# Patient Record
Sex: FEMALE | Race: OTHER | Employment: UNEMPLOYED | URBAN - METROPOLITAN AREA
[De-identification: names, ages, dates, MRNs, and addresses within clinical notes are randomized per-mention and may not be internally consistent; named-entity substitution may affect disease eponyms.]

---

## 2022-01-05 ENCOUNTER — HOSPITAL ENCOUNTER (EMERGENCY)
Facility: HOSPITAL | Age: 32
Discharge: HOME | End: 2022-01-05
Attending: EMERGENCY MEDICINE
Payer: MEDICAID

## 2022-01-05 ENCOUNTER — APPOINTMENT (OUTPATIENT)
Dept: CT IMAGING | Facility: HOSPITAL | Age: 32
End: 2022-01-05
Attending: EMERGENCY MEDICINE
Payer: MEDICAID

## 2022-01-05 VITALS
DIASTOLIC BLOOD PRESSURE: 92 MMHG | TEMPERATURE: 98 F | HEIGHT: 66 IN | BODY MASS INDEX: 35.36 KG/M2 | RESPIRATION RATE: 18 BRPM | HEART RATE: 82 BPM | WEIGHT: 220 LBS | SYSTOLIC BLOOD PRESSURE: 133 MMHG | OXYGEN SATURATION: 97 %

## 2022-01-05 DIAGNOSIS — U07.1 COVID-19: Primary | ICD-10-CM

## 2022-01-05 DIAGNOSIS — R11.2 NAUSEA AND VOMITING IN ADULT: ICD-10-CM

## 2022-01-05 LAB
ANION GAP SERPL CALC-SCNC: 10 MMOL/L (ref 0–18)
B-HCG UR QL: NEGATIVE
BASOPHILS # BLD AUTO: 0.01 X10(3) UL (ref 0–0.2)
BASOPHILS NFR BLD AUTO: 0.2 %
BILIRUB UR QL CFM: NEGATIVE
BUN BLD-MCNC: 4 MG/DL (ref 7–18)
BUN/CREAT SERPL: 6.1 (ref 10–20)
CALCIUM BLD-MCNC: 9.5 MG/DL (ref 8.5–10.1)
CHLORIDE SERPL-SCNC: 100 MMOL/L (ref 98–112)
CO2 SERPL-SCNC: 27 MMOL/L (ref 21–32)
COLOR UR: YELLOW
CREAT BLD-MCNC: 0.66 MG/DL
DEPRECATED RDW RBC AUTO: 45.1 FL (ref 35.1–46.3)
EOSINOPHIL # BLD AUTO: 0.06 X10(3) UL (ref 0–0.7)
EOSINOPHIL NFR BLD AUTO: 1 %
ERYTHROCYTE [DISTWIDTH] IN BLOOD BY AUTOMATED COUNT: 13.2 % (ref 11–15)
GLUCOSE BLD-MCNC: 120 MG/DL (ref 70–99)
GLUCOSE UR-MCNC: NEGATIVE MG/DL
HCT VFR BLD AUTO: 46.4 %
HGB BLD-MCNC: 15.5 G/DL
IMM GRANULOCYTES # BLD AUTO: 0.02 X10(3) UL (ref 0–1)
IMM GRANULOCYTES NFR BLD: 0.3 %
KETONES UR-MCNC: 20 MG/DL
LYMPHOCYTES # BLD AUTO: 2.36 X10(3) UL (ref 1–4)
LYMPHOCYTES NFR BLD AUTO: 40 %
MCH RBC QN AUTO: 31.1 PG (ref 26–34)
MCHC RBC AUTO-ENTMCNC: 33.4 G/DL (ref 31–37)
MCV RBC AUTO: 93 FL
MONOCYTES # BLD AUTO: 0.75 X10(3) UL (ref 0.1–1)
MONOCYTES NFR BLD AUTO: 12.7 %
NEUTROPHILS # BLD AUTO: 2.7 X10 (3) UL (ref 1.5–7.7)
NEUTROPHILS # BLD AUTO: 2.7 X10(3) UL (ref 1.5–7.7)
NEUTROPHILS NFR BLD AUTO: 45.8 %
NITRITE UR QL STRIP.AUTO: NEGATIVE
OSMOLALITY SERPL CALC.SUM OF ELEC: 282 MOSM/KG (ref 275–295)
PH UR: 6 [PH] (ref 5–8)
PLATELET # BLD AUTO: 336 10(3)UL (ref 150–450)
POTASSIUM SERPL-SCNC: 3.4 MMOL/L (ref 3.5–5.1)
PROT UR-MCNC: 100 MG/DL
RBC # BLD AUTO: 4.99 X10(6)UL
SODIUM SERPL-SCNC: 137 MMOL/L (ref 136–145)
SP GR UR STRIP: 1.03 (ref 1–1.03)
UROBILINOGEN UR STRIP-ACNC: 4
WBC # BLD AUTO: 5.9 X10(3) UL (ref 4–11)

## 2022-01-05 PROCEDURE — 87086 URINE CULTURE/COLONY COUNT: CPT | Performed by: EMERGENCY MEDICINE

## 2022-01-05 PROCEDURE — 87186 SC STD MICRODIL/AGAR DIL: CPT | Performed by: EMERGENCY MEDICINE

## 2022-01-05 PROCEDURE — 87077 CULTURE AEROBIC IDENTIFY: CPT | Performed by: EMERGENCY MEDICINE

## 2022-01-05 PROCEDURE — 80048 BASIC METABOLIC PNL TOTAL CA: CPT | Performed by: EMERGENCY MEDICINE

## 2022-01-05 PROCEDURE — 99284 EMERGENCY DEPT VISIT MOD MDM: CPT

## 2022-01-05 PROCEDURE — 85025 COMPLETE CBC W/AUTO DIFF WBC: CPT | Performed by: EMERGENCY MEDICINE

## 2022-01-05 PROCEDURE — 96361 HYDRATE IV INFUSION ADD-ON: CPT

## 2022-01-05 PROCEDURE — 81025 URINE PREGNANCY TEST: CPT

## 2022-01-05 PROCEDURE — 96374 THER/PROPH/DIAG INJ IV PUSH: CPT

## 2022-01-05 PROCEDURE — 81001 URINALYSIS AUTO W/SCOPE: CPT | Performed by: EMERGENCY MEDICINE

## 2022-01-05 PROCEDURE — 74176 CT ABD & PELVIS W/O CONTRAST: CPT | Performed by: EMERGENCY MEDICINE

## 2022-01-05 RX ORDER — ONDANSETRON 4 MG/1
4 TABLET, ORALLY DISINTEGRATING ORAL EVERY 4 HOURS PRN
Qty: 10 TABLET | Refills: 0 | Status: SHIPPED | OUTPATIENT
Start: 2022-01-05 | End: 2022-01-12

## 2022-01-05 RX ORDER — ONDANSETRON 2 MG/ML
4 INJECTION INTRAMUSCULAR; INTRAVENOUS ONCE
Status: COMPLETED | OUTPATIENT
Start: 2022-01-05 | End: 2022-01-05

## 2022-01-05 NOTE — ED PROVIDER NOTES
Patient Seen in: [facility] AND [facility] Emergency Department      History   Patient presents with:  Nausea/Vomiting/Diarrhea    Stated Complaint: vomiting    Subjective:   HPI    Pt is 33 yo F who p/w dysuria and vomiting x 1 week.  +left kidney and flank pain Urine Large (*)     Protein Urine 100  (*)     Urobilinogen Urine 4.0 (*)     Leukocyte Esterase Urine Trace (*)     WBC Urine 11-20 (*)     RBC Urine 3-5 (*)     Bacteria Urine Rare (*)     Squamous Epi.  Cells Few (*)     All other components within ac intraperitoneal air. No evidence of AAA. Scattered subcentimeter retroperitoneal and mesenteric lymph nodes. Case discussed with  Dr. Ricki Rodriguez in the ER  at 05:20 AM ET on 01/05/2022. Luis Alberto Anderson M.D.     Radiology exams  Viewed and reviewed

## 2022-01-05 NOTE — ED QUICK NOTES
Patient provided with discharge instructions. Verbalized understanding for plan of care at home and follow up. All question and concerns addressed prior to discharge. Iv removed, catheter intact.

## 2022-01-05 NOTE — ED INITIAL ASSESSMENT (HPI)
Patient to ER from home with c/o abdominal pain dysuria and vomiting X1 week states she recently passed a kidney stone. Patient states she just finished antibiotics for infected kidney stone.

## 2022-05-23 ENCOUNTER — OFFICE VISIT (OUTPATIENT)
Dept: OBGYN CLINIC | Facility: CLINIC | Age: 32
End: 2022-05-23
Payer: MEDICAID

## 2022-05-23 VITALS
DIASTOLIC BLOOD PRESSURE: 65 MMHG | HEART RATE: 103 BPM | WEIGHT: 221 LBS | HEIGHT: 66 IN | BODY MASS INDEX: 35.52 KG/M2 | SYSTOLIC BLOOD PRESSURE: 101 MMHG

## 2022-05-23 DIAGNOSIS — N92.6 MISSED MENSES: Primary | ICD-10-CM

## 2022-05-23 PROBLEM — Z86.59 HISTORY OF SUICIDAL IDEATION: Status: RESOLVED | Noted: 2017-01-01 | Resolved: 2022-05-23

## 2022-05-23 PROBLEM — Z86.59 HISTORY OF SUICIDAL IDEATION: Status: ACTIVE | Noted: 2017-01-01

## 2022-05-23 PROBLEM — H54.40 BLINDNESS OF LEFT EYE: Status: ACTIVE | Noted: 2017-01-01

## 2022-05-23 LAB
CONTROL LINE PRESENT WITH A CLEAR BACKGROUND (YES/NO): YES YES/NO
PREGNANCY TEST, URINE: POSITIVE

## 2022-05-23 PROCEDURE — 3074F SYST BP LT 130 MM HG: CPT | Performed by: NURSE PRACTITIONER

## 2022-05-23 PROCEDURE — 99203 OFFICE O/P NEW LOW 30 MIN: CPT | Performed by: NURSE PRACTITIONER

## 2022-05-23 PROCEDURE — 81025 URINE PREGNANCY TEST: CPT | Performed by: NURSE PRACTITIONER

## 2022-05-23 PROCEDURE — 3008F BODY MASS INDEX DOCD: CPT | Performed by: NURSE PRACTITIONER

## 2022-05-23 PROCEDURE — 3078F DIAST BP <80 MM HG: CPT | Performed by: NURSE PRACTITIONER

## 2022-05-30 ENCOUNTER — TELEPHONE (OUTPATIENT)
Dept: OBGYN CLINIC | Facility: CLINIC | Age: 32
End: 2022-05-30

## 2022-05-30 RX ORDER — ONDANSETRON 4 MG/1
1 TABLET, ORALLY DISINTEGRATING ORAL EVERY 8 HOURS PRN
COMMUNITY
Start: 2022-05-22

## 2022-05-30 RX ORDER — ONDANSETRON 4 MG/1
4 TABLET, ORALLY DISINTEGRATING ORAL EVERY 8 HOURS PRN
Qty: 20 TABLET | Refills: 1 | Status: SHIPPED | OUTPATIENT
Start: 2022-05-30

## 2022-06-02 ENCOUNTER — HOSPITAL ENCOUNTER (OUTPATIENT)
Dept: ULTRASOUND IMAGING | Facility: HOSPITAL | Age: 32
Discharge: HOME | End: 2022-06-02
Attending: NURSE PRACTITIONER
Payer: MEDICAID

## 2022-06-02 ENCOUNTER — LAB ENCOUNTER (OUTPATIENT)
Dept: LAB | Facility: HOSPITAL | Age: 32
End: 2022-06-02
Attending: NURSE PRACTITIONER
Payer: MEDICAID

## 2022-06-02 DIAGNOSIS — N92.6 MISSED MENSES: ICD-10-CM

## 2022-06-02 LAB
B-HCG SERPL-ACNC: ABNORMAL MIU/ML
RH BLOOD TYPE: NEGATIVE

## 2022-06-02 PROCEDURE — 84702 CHORIONIC GONADOTROPIN TEST: CPT

## 2022-06-02 PROCEDURE — 76801 OB US < 14 WKS SINGLE FETUS: CPT | Performed by: NURSE PRACTITIONER

## 2022-06-02 PROCEDURE — 76817 TRANSVAGINAL US OBSTETRIC: CPT | Performed by: NURSE PRACTITIONER

## 2022-06-02 PROCEDURE — 86901 BLOOD TYPING SEROLOGIC RH(D): CPT

## 2022-06-02 PROCEDURE — 86900 BLOOD TYPING SEROLOGIC ABO: CPT

## 2022-06-02 PROCEDURE — 36415 COLL VENOUS BLD VENIPUNCTURE: CPT

## 2022-06-09 NOTE — TELEPHONE ENCOUNTER
From: Joes Duncan  To: NELY Lew  Sent: 2022 4:57 PM CDT  Subject: Therapy resources    I could really use a therapist. My boyfriend is less than happy I am pregnant. He is being extremely pushy about , and ignores me half the time and we live together. All he brings up is negative aspects and told me that it's my fault I am in this position because I prolonged the ultrasound and should have just gotten rid of it sooner. I didn't even know if the pregnancy was viable at the time. I feel really alone and like my only option is to leave, but he tells me Im a piece of crap if I do that and he makes me feel guilty because he can't afford our apartment without me and I'm all the family he has left. So I feel really trapt. I've had both kinds of abortions before, with him, and I've regretted it ever since. I don't think I'll mentally be able to handle another one. I start getting panic attacks everytime I think about it. My chest constantly hurts, so does my brain, I spend my days crying, panicking, and in a mental fog. He says he's there for me if I just talk to him,   but if I bring up I don't want an  he starts pointing out all the reasons I need to and ends up upsetting me and goes back to not talking to me again and avoiding me.  I need someone to talk to

## 2022-06-30 ENCOUNTER — TELEPHONE (OUTPATIENT)
Dept: OBGYN CLINIC | Facility: CLINIC | Age: 32
End: 2022-06-30

## 2022-06-30 NOTE — TELEPHONE ENCOUNTER
Patient believes she is 13 weeks along. Went to [facility] ER yesterday for upper abdominal pain. Ultrasounds there showed that she is only 11 weeks and a few days and there is concern that she has an anterior placenta. Nurse Ed appointment set for Chela Husain OB appointment set for 7/11. Please advise.

## 2022-06-30 NOTE — TELEPHONE ENCOUNTER
Inge Gordon went to [facility] ER for epigastric pain.  13w 5d. She had an OB ultrasound at [facility] with dating discrepancy per patient. Unable to see US report in [facility] at this time. ER urinalysis showed leukocytes but culture showed no bacterial growth. Inge Gordon reports some vulvar/vaginal burning. She has a schedule new OB visit with Dr. Bell Carreon on . Instructed to make an appointment for vaginal cultures and repeat UA prior to . Tammi's GI symptoms are being address by her GI MD per patient. Inge Gordon denies cramping, bleeding, and leaking of fluid at this time. Denies fever and chills.

## 2022-07-01 ENCOUNTER — NURSE ONLY (OUTPATIENT)
Dept: OBGYN CLINIC | Facility: CLINIC | Age: 32
End: 2022-07-01
Payer: MEDICAID

## 2022-07-01 DIAGNOSIS — Z34.82 ENCOUNTER FOR SUPERVISION OF OTHER NORMAL PREGNANCY IN SECOND TRIMESTER: Primary | ICD-10-CM

## 2022-07-01 DIAGNOSIS — L81.8 DECORATIVE TATTOO: ICD-10-CM

## 2022-07-01 NOTE — PROGRESS NOTES
Pt here today for RN [facility] Education. Missed menses apt with: Suresh Nelson  LMP: 3/26/2022    Pre  BMI: 35.04  EPDS score: 13   Pt already given resources for Nela Mary- have been in contact. +UPT at home:  n/a  +UPT in office: 2022    US: 2022  Working MUSA: 22  Hx of genetic abnormality in family: no  Hx of varicella: yes  Flu Vaccine: no  Covid Vaccine: no     Consent (if needed): n/a      Sterilization/Contraception: wants hysterectomy     OUD Screening: Pt. Has answered NO 5P questions and has NO  risk factors. Pt. Given What pregnant women need to know handout. Educational material reviewed with patient: Prenatal care, nutrition, weight gain recommendations, travel, exercise, intercourse, pregnancy changes, safe medications, pregnancy and work, fetal movement, labor and  labor, warning signs, food safety, tdap, cord blood, breastfeeding-try breast, circumcision-yes, and Group B strep. Blood transfusion if needed: yes  PN labs: ordered    Optional genetic screening labs were reviewed: Bibi Morganelor, FTS with US, Quad screen MSAFP and CF screening.      [facility] Media Policy: reviewed     NOB apt: 22 JENNIE

## 2022-07-18 ENCOUNTER — INITIAL PRENATAL (OUTPATIENT)
Dept: OBGYN CLINIC | Facility: CLINIC | Age: 32
End: 2022-07-18
Payer: MEDICAID

## 2022-07-18 ENCOUNTER — HOSPITAL ENCOUNTER (OUTPATIENT)
Dept: PERINATAL CARE | Facility: HOSPITAL | Age: 32
End: 2022-07-18
Attending: OBSTETRICS & GYNECOLOGY
Payer: MEDICAID

## 2022-07-18 ENCOUNTER — TELEPHONE (OUTPATIENT)
Dept: OBGYN CLINIC | Facility: CLINIC | Age: 32
End: 2022-07-18

## 2022-07-18 ENCOUNTER — LAB ENCOUNTER (OUTPATIENT)
Dept: LAB | Facility: HOSPITAL | Age: 32
End: 2022-07-18
Attending: OBSTETRICS & GYNECOLOGY
Payer: MEDICAID

## 2022-07-18 ENCOUNTER — HOSPITAL ENCOUNTER (OUTPATIENT)
Dept: PERINATAL CARE | Facility: HOSPITAL | Age: 32
Discharge: HOME | End: 2022-07-18
Attending: OBSTETRICS & GYNECOLOGY
Payer: MEDICAID

## 2022-07-18 VITALS
BODY MASS INDEX: 34 KG/M2 | SYSTOLIC BLOOD PRESSURE: 120 MMHG | WEIGHT: 212 LBS | HEART RATE: 88 BPM | DIASTOLIC BLOOD PRESSURE: 67 MMHG

## 2022-07-18 VITALS
SYSTOLIC BLOOD PRESSURE: 111 MMHG | BODY MASS INDEX: 34 KG/M2 | WEIGHT: 212.81 LBS | HEART RATE: 116 BPM | DIASTOLIC BLOOD PRESSURE: 74 MMHG

## 2022-07-18 DIAGNOSIS — F43.10 PTSD (POST-TRAUMATIC STRESS DISORDER): ICD-10-CM

## 2022-07-18 DIAGNOSIS — Z34.82 ENCOUNTER FOR SUPERVISION OF OTHER NORMAL PREGNANCY IN SECOND TRIMESTER: ICD-10-CM

## 2022-07-18 DIAGNOSIS — O09.891 MEDICATION EXPOSURE DURING FIRST TRIMESTER OF PREGNANCY: Primary | ICD-10-CM

## 2022-07-18 DIAGNOSIS — F33.41 RECURRENT MAJOR DEPRESSIVE DISORDER, IN PARTIAL REMISSION (HCC): ICD-10-CM

## 2022-07-18 DIAGNOSIS — O09.891 MEDICATION EXPOSURE DURING FIRST TRIMESTER OF PREGNANCY: ICD-10-CM

## 2022-07-18 DIAGNOSIS — E66.9 CLASS 2 OBESITY WITHOUT SERIOUS COMORBIDITY WITH BODY MASS INDEX (BMI) OF 35.0 TO 35.9 IN ADULT, UNSPECIFIED OBESITY TYPE: ICD-10-CM

## 2022-07-18 DIAGNOSIS — Z34.82 ENCOUNTER FOR SUPERVISION OF OTHER NORMAL PREGNANCY IN SECOND TRIMESTER: Primary | ICD-10-CM

## 2022-07-18 DIAGNOSIS — L81.8 DECORATIVE TATTOO: ICD-10-CM

## 2022-07-18 LAB
ANTIBODY SCREEN: NEGATIVE
APPEARANCE: CLEAR
BASOPHILS # BLD AUTO: 0.03 X10(3) UL (ref 0–0.2)
BASOPHILS NFR BLD AUTO: 0.3 %
BILIRUB UR QL: NEGATIVE
BILIRUBIN: NEGATIVE
CLARITY UR: CLEAR
COLOR UR: YELLOW
DEPRECATED HBV CORE AB SER IA-ACNC: 58.4 NG/ML
DEPRECATED RDW RBC AUTO: 41.3 FL (ref 35.1–46.3)
EOSINOPHIL # BLD AUTO: 0.2 X10(3) UL (ref 0–0.7)
EOSINOPHIL NFR BLD AUTO: 1.8 %
ERYTHROCYTE [DISTWIDTH] IN BLOOD BY AUTOMATED COUNT: 12 % (ref 11–15)
GLUCOSE (URINE DIPSTICK): NEGATIVE MG/DL
GLUCOSE UR-MCNC: NEGATIVE MG/DL
HBV SURFACE AG SER-ACNC: <0.1 [IU]/L
HBV SURFACE AG SERPL QL IA: NONREACTIVE
HCT VFR BLD AUTO: 37.5 %
HCV AB SERPL QL IA: NONREACTIVE
HGB BLD-MCNC: 12.3 G/DL
HGB UR QL STRIP.AUTO: NEGATIVE
IMM GRANULOCYTES # BLD AUTO: 0.04 X10(3) UL (ref 0–1)
IMM GRANULOCYTES NFR BLD: 0.4 %
KETONES (URINE DIPSTICK): NEGATIVE MG/DL
LEUKOCYTE ESTERASE UR QL STRIP.AUTO: NEGATIVE
LYMPHOCYTES # BLD AUTO: 3.29 X10(3) UL (ref 1–4)
LYMPHOCYTES NFR BLD AUTO: 30.3 %
MCH RBC QN AUTO: 30.6 PG (ref 26–34)
MCHC RBC AUTO-ENTMCNC: 32.8 G/DL (ref 31–37)
MCV RBC AUTO: 93.3 FL
MONOCYTES # BLD AUTO: 0.41 X10(3) UL (ref 0.1–1)
MONOCYTES NFR BLD AUTO: 3.8 %
MULTISTIX EXPIRATION DATE: ABNORMAL DATE
MULTISTIX LOT#: ABNORMAL NUMERIC
NEUTROPHILS # BLD AUTO: 6.88 X10 (3) UL (ref 1.5–7.7)
NEUTROPHILS # BLD AUTO: 6.88 X10(3) UL (ref 1.5–7.7)
NEUTROPHILS NFR BLD AUTO: 63.4 %
NITRITE UR QL STRIP.AUTO: NEGATIVE
NITRITE, URINE: NEGATIVE
OCCULT BLOOD: NEGATIVE
PH UR: 5.5 [PH] (ref 5–8)
PH, URINE: 7 (ref 4.5–8)
PLATELET # BLD AUTO: 326 10(3)UL (ref 150–450)
PROTEIN (URINE DIPSTICK): NEGATIVE MG/DL
RBC # BLD AUTO: 4.02 X10(6)UL
RH BLOOD TYPE: NEGATIVE
RUBV IGG SER QL: POSITIVE
RUBV IGG SER-ACNC: 98.9 IU/ML (ref 10–?)
SP GR UR STRIP: >=1.03 (ref 1–1.03)
SPECIFIC GRAVITY: 1.02 (ref 1–1.03)
TSI SER-ACNC: 1.39 MIU/ML (ref 0.36–3.74)
URINE-COLOR: YELLOW
UROBILINOGEN UR STRIP-ACNC: 0.2
UROBILINOGEN,SEMI-QN: 0.2 MG/DL (ref 0–1.9)
WBC # BLD AUTO: 10.9 X10(3) UL (ref 4–11)

## 2022-07-18 PROCEDURE — 3078F DIAST BP <80 MM HG: CPT | Performed by: OBSTETRICS & GYNECOLOGY

## 2022-07-18 PROCEDURE — 76815 OB US LIMITED FETUS(S): CPT | Performed by: OBSTETRICS & GYNECOLOGY

## 2022-07-18 PROCEDURE — 86901 BLOOD TYPING SEROLOGIC RH(D): CPT

## 2022-07-18 PROCEDURE — 84443 ASSAY THYROID STIM HORMONE: CPT

## 2022-07-18 PROCEDURE — 0500F INITIAL PRENATAL CARE VISIT: CPT | Performed by: OBSTETRICS & GYNECOLOGY

## 2022-07-18 PROCEDURE — 86850 RBC ANTIBODY SCREEN: CPT

## 2022-07-18 PROCEDURE — 3074F SYST BP LT 130 MM HG: CPT | Performed by: OBSTETRICS & GYNECOLOGY

## 2022-07-18 PROCEDURE — 85025 COMPLETE CBC W/AUTO DIFF WBC: CPT

## 2022-07-18 PROCEDURE — 82728 ASSAY OF FERRITIN: CPT

## 2022-07-18 PROCEDURE — 87086 URINE CULTURE/COLONY COUNT: CPT

## 2022-07-18 PROCEDURE — 36415 COLL VENOUS BLD VENIPUNCTURE: CPT

## 2022-07-18 PROCEDURE — 86780 TREPONEMA PALLIDUM: CPT

## 2022-07-18 PROCEDURE — 87389 HIV-1 AG W/HIV-1&-2 AB AG IA: CPT

## 2022-07-18 PROCEDURE — 87340 HEPATITIS B SURFACE AG IA: CPT

## 2022-07-18 PROCEDURE — 81003 URINALYSIS AUTO W/O SCOPE: CPT | Performed by: OBSTETRICS & GYNECOLOGY

## 2022-07-18 PROCEDURE — 86803 HEPATITIS C AB TEST: CPT

## 2022-07-18 PROCEDURE — 86762 RUBELLA ANTIBODY: CPT

## 2022-07-18 PROCEDURE — 81003 URINALYSIS AUTO W/O SCOPE: CPT

## 2022-07-18 PROCEDURE — 86900 BLOOD TYPING SEROLOGIC ABO: CPT

## 2022-07-18 RX ORDER — QUETIAPINE FUMARATE 25 MG/1
25 TABLET, FILM COATED ORAL NIGHTLY
COMMUNITY

## 2022-07-18 RX ORDER — CHOLECALCIFEROL (VITAMIN D3) 25 MCG
1 TABLET,CHEWABLE ORAL DAILY
COMMUNITY

## 2022-07-18 NOTE — TELEPHONE ENCOUNTER
Call placed to [facility] to request that patient is accommodated for expedited consult and ultrasound r/t current use of antipsychotic medication (QUEtiapine)    Order entered PA initiated for [address]97[address]. Patient scheduled for 2pm today. Patient and provider notified.

## 2022-07-18 NOTE — PROGRESS NOTES
Pt with hx of PTSD and pt was started on seroquel 1 week ago by her other doctor(pt was told safe in pregnancy). Pt has not seen mfm yet,  We are on the phone currently to help secure an appt for consult/ultrasound for pt. Pt given appt for 2 pm today, pt going there now.
Implemented All Universal Safety Interventions:  [place] to call system. Call bell, personal items and telephone within reach. Instruct patient to call for assistance. Room bathroom lighting operational. Non-slip footwear when patient is off stretcher. Physically safe environment: no spills, clutter or unnecessary equipment. Stretcher in lowest position, wheels locked, appropriate side rails in place.

## 2022-07-19 ENCOUNTER — TELEPHONE (OUTPATIENT)
Dept: PERINATAL CARE | Facility: HOSPITAL | Age: 32
End: 2022-07-19

## 2022-07-19 LAB
C TRACH DNA SPEC QL NAA+PROBE: NEGATIVE
HPV I/H RISK 1 DNA SPEC QL NAA+PROBE: NEGATIVE
N GONORRHOEA DNA SPEC QL NAA+PROBE: NEGATIVE

## 2022-07-20 LAB — T PALLIDUM AB SER QL: NEGATIVE

## 2022-07-25 NOTE — TELEPHONE ENCOUNTER
Your case has been sent to Medical Review. Provider Name: Rosana Costa Contact: [facility]  Provider Address: [address]  [facility] Phone Number: [phone]   Fax Number: [phone]  Patient Name: Svitlana Neff Patient Id: [id number]  Insurance Carrier: [facility]  Site Name: Jael Frederick Site ID: [id number]  Site Address: [address] [place] [address]      Primary Diagnosis Code: O09.891 Description: Supervision of other high risk pregnancies, first trimester  Secondary Diagnosis Code:  Description:   Date of Service: Not provided    CPT Code: 47424 Description: [facility] [place] FETUS  Case Number: [id number]  Review Date: 7/25/2022 12:37:41 PM  Expiration Date: N/A  Status: Your case has been sent to Medical Review.

## 2022-07-27 ENCOUNTER — TELEPHONE (OUTPATIENT)
Dept: OBGYN CLINIC | Facility: CLINIC | Age: 32
End: 2022-07-27

## 2022-07-27 NOTE — TELEPHONE ENCOUNTER
Pt called and informed of results and recommendations. Pt voices understanding.        ----- Message from Rhonda Tiwari MD sent at 7/27/2022  2:54 PM CDT -----  Normal pap,  yeast noted on pap.   Please inform,  may use monistat

## 2022-08-04 ENCOUNTER — TELEPHONE (OUTPATIENT)
Dept: OBGYN CLINIC | Facility: CLINIC | Age: 32
End: 2022-08-04

## 2022-08-04 ENCOUNTER — ROUTINE PRENATAL (OUTPATIENT)
Dept: OBGYN CLINIC | Facility: CLINIC | Age: 32
End: 2022-08-04
Payer: MEDICAID

## 2022-08-04 VITALS — SYSTOLIC BLOOD PRESSURE: 108 MMHG | WEIGHT: 208 LBS | DIASTOLIC BLOOD PRESSURE: 64 MMHG | BODY MASS INDEX: 34 KG/M2

## 2022-08-04 DIAGNOSIS — N89.8 VAGINAL DISCHARGE: ICD-10-CM

## 2022-08-04 DIAGNOSIS — Z11.3 SCREENING EXAMINATION FOR STD (SEXUALLY TRANSMITTED DISEASE): ICD-10-CM

## 2022-08-04 DIAGNOSIS — Z34.82 ENCOUNTER FOR SUPERVISION OF OTHER NORMAL PREGNANCY IN SECOND TRIMESTER: Primary | ICD-10-CM

## 2022-08-04 LAB
BILIRUBIN: NEGATIVE
GLUCOSE (URINE DIPSTICK): NEGATIVE MG/DL
KETONES (URINE DIPSTICK): NEGATIVE MG/DL
MULTISTIX LOT#: ABNORMAL NUMERIC
NITRITE, URINE: NEGATIVE
PH, URINE: 7.5 (ref 4.5–8)
PROTEIN (URINE DIPSTICK): NEGATIVE MG/DL
SPECIFIC GRAVITY: 1.02 (ref 1–1.03)
URINE-COLOR: YELLOW
UROBILINOGEN,SEMI-QN: 0.2 MG/DL (ref 0–1.9)

## 2022-08-04 PROCEDURE — 3078F DIAST BP <80 MM HG: CPT | Performed by: OBSTETRICS & GYNECOLOGY

## 2022-08-04 PROCEDURE — 0502F SUBSEQUENT PRENATAL CARE: CPT | Performed by: OBSTETRICS & GYNECOLOGY

## 2022-08-04 PROCEDURE — 81002 URINALYSIS NONAUTO W/O SCOPE: CPT | Performed by: OBSTETRICS & GYNECOLOGY

## 2022-08-04 PROCEDURE — 3074F SYST BP LT 130 MM HG: CPT | Performed by: OBSTETRICS & GYNECOLOGY

## 2022-08-04 NOTE — PROGRESS NOTES
Problem visit:  Reports vaginal discharge, headaches and dizziness. Discussed the physiologic changes of pregnancy. Patient reassured. GC/Chlamydia Culture Done. BV Panel Done. Discharge more C/W normal leukorrhea of pregnancy. To keep regular prenatal visit. F/U Cultures.

## 2022-08-04 NOTE — TELEPHONE ENCOUNTER
Patient was recently diagnosed with a yeast infection and has finished taking the recommended over the counter medication. She has started having a green colored discharge, stinging sensation and cramping near her cervix. Please advise.

## 2022-08-04 NOTE — TELEPHONE ENCOUNTER
Diagnosed with yeast infection on 07/27 and was advised to treat with Monistat. Pt used Monistat and noticed vaginal burning after a couple of uses. Pt voices she now has greenish vaginal discharge and her vaginal area continues to be irritated. Pt scheduled to see Dr. Bishop Olea today at 4 pm. Pt voices she will be coming from work, and she may be late. Advised pt if she cannot make her 4pm appointment, she needs to be evaluated at the [facility] ER. Pt voices understanding.

## 2022-08-05 LAB
C TRACH DNA SPEC QL NAA+PROBE: NEGATIVE
N GONORRHOEA DNA SPEC QL NAA+PROBE: NEGATIVE

## 2022-08-06 LAB
GENITAL VAGINOSIS SCREEN: NEGATIVE
TRICHOMONAS SCREEN: NEGATIVE

## 2022-08-19 ENCOUNTER — ROUTINE PRENATAL (OUTPATIENT)
Dept: OBGYN CLINIC | Facility: CLINIC | Age: 32
End: 2022-08-19
Payer: MEDICAID

## 2022-08-19 VITALS — BODY MASS INDEX: 34 KG/M2 | WEIGHT: 210.19 LBS | SYSTOLIC BLOOD PRESSURE: 114 MMHG | DIASTOLIC BLOOD PRESSURE: 70 MMHG

## 2022-08-19 DIAGNOSIS — R10.11 RIGHT UPPER QUADRANT PAIN: ICD-10-CM

## 2022-08-19 DIAGNOSIS — O12.12 GESTATIONAL PROTEINURIA IN SECOND TRIMESTER: ICD-10-CM

## 2022-08-19 DIAGNOSIS — Z34.82 ENCOUNTER FOR SUPERVISION OF OTHER NORMAL PREGNANCY IN SECOND TRIMESTER: Primary | ICD-10-CM

## 2022-08-19 LAB
GLUCOSE (URINE DIPSTICK): NEGATIVE MG/DL
KETONES (URINE DIPSTICK): 15 MG/DL
LEUKOCYTES: NEGATIVE
MULTISTIX LOT#: ABNORMAL NUMERIC
NITRITE, URINE: NEGATIVE
OCCULT BLOOD: NEGATIVE
PH, URINE: 6 (ref 4.5–8)
PROTEIN (URINE DIPSTICK): 30 MG/DL
SPECIFIC GRAVITY: 1.02 (ref 1–1.03)
UROBILINOGEN,SEMI-QN: 1 MG/DL (ref 0–1.9)

## 2022-08-19 PROCEDURE — 81002 URINALYSIS NONAUTO W/O SCOPE: CPT | Performed by: OBSTETRICS & GYNECOLOGY

## 2022-08-19 PROCEDURE — 0502F SUBSEQUENT PRENATAL CARE: CPT | Performed by: OBSTETRICS & GYNECOLOGY

## 2022-08-19 PROCEDURE — 3078F DIAST BP <80 MM HG: CPT | Performed by: OBSTETRICS & GYNECOLOGY

## 2022-08-19 PROCEDURE — 3074F SYST BP LT 130 MM HG: CPT | Performed by: OBSTETRICS & GYNECOLOGY

## 2022-08-19 NOTE — PROGRESS NOTES
Pt noted mild right upper quadrant pain on/off lasting a few seconds,  Pt counseled on going to the er, pt stated not persistent, pt wants to schedule as an outpt, did agree to go er for any worsening or persistence. Pt also noted on /off palpitations for 8 weeks, pt advised to see PCP now, pt requested pcp in em,  Pt given literature with our pcps and numbers, will call today.

## 2022-08-22 ENCOUNTER — HOSPITAL ENCOUNTER (OUTPATIENT)
Dept: PERINATAL CARE | Facility: HOSPITAL | Age: 32
End: 2022-08-22
Attending: OBSTETRICS & GYNECOLOGY
Payer: MEDICAID

## 2022-08-22 ENCOUNTER — TELEPHONE (OUTPATIENT)
Dept: PERINATAL CARE | Facility: HOSPITAL | Age: 32
End: 2022-08-22

## 2022-08-22 ENCOUNTER — HOSPITAL ENCOUNTER (OUTPATIENT)
Dept: PERINATAL CARE | Facility: HOSPITAL | Age: 32
Discharge: HOME | End: 2022-08-22
Attending: OBSTETRICS & GYNECOLOGY
Payer: MEDICAID

## 2022-08-22 VITALS
WEIGHT: 210 LBS | DIASTOLIC BLOOD PRESSURE: 54 MMHG | SYSTOLIC BLOOD PRESSURE: 111 MMHG | BODY MASS INDEX: 34 KG/M2 | HEART RATE: 99 BPM

## 2022-08-22 DIAGNOSIS — Q89.9 CONGENITAL BIRTH DEFECT: Primary | ICD-10-CM

## 2022-08-22 DIAGNOSIS — O99.212 OBESITY AFFECTING PREGNANCY IN SECOND TRIMESTER: ICD-10-CM

## 2022-08-22 DIAGNOSIS — F43.10 PTSD (POST-TRAUMATIC STRESS DISORDER): ICD-10-CM

## 2022-08-22 DIAGNOSIS — F33.9 RECURRENT MAJOR DEPRESSIVE DISORDER, REMISSION STATUS UNSPECIFIED (HCC): ICD-10-CM

## 2022-08-22 DIAGNOSIS — F41.9 ANXIETY: ICD-10-CM

## 2022-08-22 DIAGNOSIS — E66.9 CLASS 2 OBESITY WITH BODY MASS INDEX (BMI) OF 35.0 TO 35.9 IN ADULT, UNSPECIFIED OBESITY TYPE, UNSPECIFIED WHETHER SERIOUS COMORBIDITY PRESENT: ICD-10-CM

## 2022-08-22 DIAGNOSIS — H54.40 BLINDNESS OF LEFT EYE, UNSPECIFIED RIGHT EYE VISUAL IMPAIRMENT CATEGORY: ICD-10-CM

## 2022-08-22 DIAGNOSIS — Q89.9 CONGENITAL BIRTH DEFECT: ICD-10-CM

## 2022-08-22 PROCEDURE — 76811 OB US DETAILED SNGL FETUS: CPT | Performed by: OBSTETRICS & GYNECOLOGY

## 2022-09-25 ENCOUNTER — HOSPITAL ENCOUNTER (EMERGENCY)
Facility: HOSPITAL | Age: 32
Discharge: HOME | End: 2022-09-25

## 2022-09-25 VITALS
HEART RATE: 85 BPM | HEIGHT: 66 IN | TEMPERATURE: 98 F | OXYGEN SATURATION: 98 % | DIASTOLIC BLOOD PRESSURE: 36 MMHG | BODY MASS INDEX: 33.75 KG/M2 | WEIGHT: 210 LBS | RESPIRATION RATE: 18 BRPM | SYSTOLIC BLOOD PRESSURE: 103 MMHG

## 2022-09-25 DIAGNOSIS — R07.9 CHEST PAIN OF UNCERTAIN ETIOLOGY: Primary | ICD-10-CM

## 2022-09-25 LAB
ALBUMIN SERPL-MCNC: 2.7 G/DL (ref 3.4–5)
ALP LIVER SERPL-CCNC: 70 U/L
ALT SERPL-CCNC: 14 U/L
ANION GAP SERPL CALC-SCNC: 9 MMOL/L (ref 0–18)
AST SERPL-CCNC: 8 U/L (ref 15–37)
B-HCG UR QL: POSITIVE
BASOPHILS # BLD AUTO: 0.03 X10(3) UL (ref 0–0.2)
BASOPHILS NFR BLD AUTO: 0.4 %
BILIRUB DIRECT SERPL-MCNC: <0.1 MG/DL (ref 0–0.2)
BILIRUB SERPL-MCNC: 0.1 MG/DL (ref 0.1–2)
BILIRUB UR QL: NEGATIVE
BUN BLD-MCNC: 5 MG/DL (ref 7–18)
BUN/CREAT SERPL: 11.1 (ref 10–20)
CALCIUM BLD-MCNC: 8.1 MG/DL (ref 8.5–10.1)
CHLORIDE SERPL-SCNC: 111 MMOL/L (ref 98–112)
CO2 SERPL-SCNC: 20 MMOL/L (ref 21–32)
COLOR UR: YELLOW
CREAT BLD-MCNC: 0.45 MG/DL
D DIMER PPP FEU-MCNC: 0.43 UG/ML FEU (ref ?–0.5)
DEPRECATED RDW RBC AUTO: 41.4 FL (ref 35.1–46.3)
EOSINOPHIL # BLD AUTO: 0.11 X10(3) UL (ref 0–0.7)
EOSINOPHIL NFR BLD AUTO: 1.3 %
ERYTHROCYTE [DISTWIDTH] IN BLOOD BY AUTOMATED COUNT: 12.4 % (ref 11–15)
GFR SERPLBLD BASED ON 1.73 SQ M-ARVRAT: 131 ML/MIN/1.73M2 (ref 60–?)
GLUCOSE BLD-MCNC: 89 MG/DL (ref 70–99)
GLUCOSE UR-MCNC: NEGATIVE MG/DL
HCT VFR BLD AUTO: 34 %
HGB BLD-MCNC: 11.5 G/DL
HGB UR QL STRIP.AUTO: NEGATIVE
IMM GRANULOCYTES # BLD AUTO: 0.03 X10(3) UL (ref 0–1)
IMM GRANULOCYTES NFR BLD: 0.4 %
KETONES UR-MCNC: NEGATIVE MG/DL
LEUKOCYTE ESTERASE UR QL STRIP.AUTO: NEGATIVE
LYMPHOCYTES # BLD AUTO: 2.13 X10(3) UL (ref 1–4)
LYMPHOCYTES NFR BLD AUTO: 25.6 %
MCH RBC QN AUTO: 30.9 PG (ref 26–34)
MCHC RBC AUTO-ENTMCNC: 33.8 G/DL (ref 31–37)
MCV RBC AUTO: 91.4 FL
MONOCYTES # BLD AUTO: 0.42 X10(3) UL (ref 0.1–1)
MONOCYTES NFR BLD AUTO: 5.1 %
NEUTROPHILS # BLD AUTO: 5.59 X10 (3) UL (ref 1.5–7.7)
NEUTROPHILS # BLD AUTO: 5.59 X10(3) UL (ref 1.5–7.7)
NEUTROPHILS NFR BLD AUTO: 67.2 %
NITRITE UR QL STRIP.AUTO: NEGATIVE
OSMOLALITY SERPL CALC.SUM OF ELEC: 287 MOSM/KG (ref 275–295)
PH UR: 8.5 [PH] (ref 5–8)
PLATELET # BLD AUTO: 297 10(3)UL (ref 150–450)
POTASSIUM SERPL-SCNC: 3.6 MMOL/L (ref 3.5–5.1)
PROT SERPL-MCNC: 6.4 G/DL (ref 6.4–8.2)
PROT UR-MCNC: NEGATIVE MG/DL
RBC # BLD AUTO: 3.72 X10(6)UL
SODIUM SERPL-SCNC: 140 MMOL/L (ref 136–145)
SP GR UR STRIP: 1.02 (ref 1–1.03)
TROPONIN I HIGH SENSITIVITY: 4 NG/L
UROBILINOGEN UR STRIP-ACNC: 0.2
WBC # BLD AUTO: 8.3 X10(3) UL (ref 4–11)
YEAST UR QL: PRESENT /HPF
YEAST UR QL: PRESENT /HPF

## 2022-09-25 PROCEDURE — 80076 HEPATIC FUNCTION PANEL: CPT | Performed by: NURSE PRACTITIONER

## 2022-09-25 PROCEDURE — 93010 ELECTROCARDIOGRAM REPORT: CPT | Performed by: NURSE PRACTITIONER

## 2022-09-25 PROCEDURE — 81025 URINE PREGNANCY TEST: CPT

## 2022-09-25 PROCEDURE — 85025 COMPLETE CBC W/AUTO DIFF WBC: CPT | Performed by: NURSE PRACTITIONER

## 2022-09-25 PROCEDURE — 85379 FIBRIN DEGRADATION QUANT: CPT | Performed by: EMERGENCY MEDICINE

## 2022-09-25 PROCEDURE — 99284 EMERGENCY DEPT VISIT MOD MDM: CPT

## 2022-09-25 PROCEDURE — 80048 BASIC METABOLIC PNL TOTAL CA: CPT | Performed by: NURSE PRACTITIONER

## 2022-09-25 PROCEDURE — 81001 URINALYSIS AUTO W/SCOPE: CPT | Performed by: NURSE PRACTITIONER

## 2022-09-25 PROCEDURE — 93005 ELECTROCARDIOGRAM TRACING: CPT

## 2022-09-25 PROCEDURE — 36415 COLL VENOUS BLD VENIPUNCTURE: CPT

## 2022-09-25 PROCEDURE — 84484 ASSAY OF TROPONIN QUANT: CPT | Performed by: NURSE PRACTITIONER

## 2022-09-25 PROCEDURE — 81015 MICROSCOPIC EXAM OF URINE: CPT | Performed by: NURSE PRACTITIONER

## 2022-09-25 NOTE — ED INITIAL ASSESSMENT (HPI)
Pt to ED with boyfriend with c/o mid sternal chest discomfort that started about 10 min prior to arrival. Pt states approximately 25 weeks pregnant. Pt appears anxious in triage. Pt denies cough or fever. Pt is alert and oriented x4. Encouraged breathing exercises in triage. Pt denies fall or trauma.   Pt states no fetal movement felt today.

## 2022-09-26 ENCOUNTER — OFFICE VISIT (OUTPATIENT)
Dept: INTERNAL MEDICINE CLINIC | Facility: CLINIC | Age: 32
End: 2022-09-26

## 2022-09-26 VITALS
OXYGEN SATURATION: 98 % | HEART RATE: 106 BPM | DIASTOLIC BLOOD PRESSURE: 71 MMHG | BODY MASS INDEX: 33.75 KG/M2 | HEIGHT: 66 IN | WEIGHT: 210 LBS | SYSTOLIC BLOOD PRESSURE: 118 MMHG

## 2022-09-26 DIAGNOSIS — K21.00 GASTROESOPHAGEAL REFLUX DISEASE WITH ESOPHAGITIS, UNSPECIFIED WHETHER HEMORRHAGE: Primary | ICD-10-CM

## 2022-09-26 DIAGNOSIS — Z3A.25 25 WEEKS GESTATION OF PREGNANCY: ICD-10-CM

## 2022-09-26 PROCEDURE — 3008F BODY MASS INDEX DOCD: CPT | Performed by: INTERNAL MEDICINE

## 2022-09-26 PROCEDURE — 99203 OFFICE O/P NEW LOW 30 MIN: CPT | Performed by: INTERNAL MEDICINE

## 2022-09-26 PROCEDURE — 3074F SYST BP LT 130 MM HG: CPT | Performed by: INTERNAL MEDICINE

## 2022-09-26 PROCEDURE — 3078F DIAST BP <80 MM HG: CPT | Performed by: INTERNAL MEDICINE

## 2022-10-11 ENCOUNTER — ROUTINE PRENATAL (OUTPATIENT)
Dept: OBGYN CLINIC | Facility: CLINIC | Age: 32
End: 2022-10-11
Payer: MEDICAID

## 2022-10-11 VITALS — SYSTOLIC BLOOD PRESSURE: 90 MMHG | WEIGHT: 212.19 LBS | DIASTOLIC BLOOD PRESSURE: 68 MMHG | BODY MASS INDEX: 34 KG/M2

## 2022-10-11 DIAGNOSIS — Z34.82 ENCOUNTER FOR SUPERVISION OF OTHER NORMAL PREGNANCY IN SECOND TRIMESTER: Primary | ICD-10-CM

## 2022-10-11 LAB
APPEARANCE: CLEAR
BILIRUBIN: NEGATIVE
GLUCOSE (URINE DIPSTICK): NEGATIVE MG/DL
KETONES (URINE DIPSTICK): NEGATIVE MG/DL
LEUKOCYTES: NEGATIVE
MULTISTIX LOT#: 2030 NUMERIC
NITRITE, URINE: NEGATIVE
OCCULT BLOOD: NEGATIVE
PH, URINE: 7.5 (ref 4.5–8)
SPECIFIC GRAVITY: 1.02 (ref 1–1.03)
URINE-COLOR: YELLOW
UROBILINOGEN,SEMI-QN: 1 MG/DL (ref 0–1.9)

## 2022-10-11 PROCEDURE — 81003 URINALYSIS AUTO W/O SCOPE: CPT | Performed by: OBSTETRICS & GYNECOLOGY

## 2022-10-11 PROCEDURE — 0502F SUBSEQUENT PRENATAL CARE: CPT | Performed by: OBSTETRICS & GYNECOLOGY

## 2022-10-11 PROCEDURE — 3078F DIAST BP <80 MM HG: CPT | Performed by: OBSTETRICS & GYNECOLOGY

## 2022-10-11 PROCEDURE — 3074F SYST BP LT 130 MM HG: CPT | Performed by: OBSTETRICS & GYNECOLOGY

## 2022-10-11 NOTE — PROGRESS NOTES
Pt has GERD,  Has seen pcp, pt also counseled on dietary modification, including stopping drinking soda/pop.

## 2022-10-18 ENCOUNTER — LAB ENCOUNTER (OUTPATIENT)
Dept: LAB | Facility: HOSPITAL | Age: 32
End: 2022-10-18
Attending: OBSTETRICS & GYNECOLOGY
Payer: MEDICAID

## 2022-10-18 DIAGNOSIS — Z34.82 ENCOUNTER FOR SUPERVISION OF OTHER NORMAL PREGNANCY IN SECOND TRIMESTER: ICD-10-CM

## 2022-10-18 LAB
BASOPHILS # BLD AUTO: 0.03 X10(3) UL (ref 0–0.2)
BASOPHILS NFR BLD AUTO: 0.4 %
DEPRECATED HBV CORE AB SER IA-ACNC: 8.8 NG/ML
DEPRECATED RDW RBC AUTO: 43.4 FL (ref 35.1–46.3)
EOSINOPHIL # BLD AUTO: 0.14 X10(3) UL (ref 0–0.7)
EOSINOPHIL NFR BLD AUTO: 1.9 %
ERYTHROCYTE [DISTWIDTH] IN BLOOD BY AUTOMATED COUNT: 12.5 % (ref 11–15)
GLUCOSE 1H P GLC SERPL-MCNC: 98 MG/DL
HCT VFR BLD AUTO: 39.3 %
HGB BLD-MCNC: 12.7 G/DL
IMM GRANULOCYTES # BLD AUTO: 0.02 X10(3) UL (ref 0–1)
IMM GRANULOCYTES NFR BLD: 0.3 %
LYMPHOCYTES # BLD AUTO: 2.52 X10(3) UL (ref 1–4)
LYMPHOCYTES NFR BLD AUTO: 34.7 %
MCH RBC QN AUTO: 30.7 PG (ref 26–34)
MCHC RBC AUTO-ENTMCNC: 32.3 G/DL (ref 31–37)
MCV RBC AUTO: 94.9 FL
MONOCYTES # BLD AUTO: 0.49 X10(3) UL (ref 0.1–1)
MONOCYTES NFR BLD AUTO: 6.7 %
NEUTROPHILS # BLD AUTO: 4.06 X10 (3) UL (ref 1.5–7.7)
NEUTROPHILS # BLD AUTO: 4.06 X10(3) UL (ref 1.5–7.7)
NEUTROPHILS NFR BLD AUTO: 56 %
PLATELET # BLD AUTO: 365 10(3)UL (ref 150–450)
RBC # BLD AUTO: 4.14 X10(6)UL
WBC # BLD AUTO: 7.3 X10(3) UL (ref 4–11)

## 2022-10-18 PROCEDURE — 82950 GLUCOSE TEST: CPT

## 2022-10-18 PROCEDURE — 82728 ASSAY OF FERRITIN: CPT

## 2022-10-18 PROCEDURE — 36415 COLL VENOUS BLD VENIPUNCTURE: CPT

## 2022-10-18 PROCEDURE — 85025 COMPLETE CBC W/AUTO DIFF WBC: CPT

## 2022-10-19 ENCOUNTER — TELEPHONE (OUTPATIENT)
Dept: PERINATAL CARE | Facility: HOSPITAL | Age: 32
End: 2022-10-19

## 2022-10-19 DIAGNOSIS — O09.891 MEDICATION EXPOSURE DURING FIRST TRIMESTER OF PREGNANCY: Primary | ICD-10-CM

## 2022-10-19 DIAGNOSIS — O99.210 OBESITY AFFECTING PREGNANCY, ANTEPARTUM: ICD-10-CM

## 2022-10-24 NOTE — TELEPHONE ENCOUNTER
cori Name: DR. Ventura Chisholm  Provider Address: [address] [facility] Phone Number: [phone]   Fax Number: [phone]  Patient Name: Luan Carrillo Patient Id: [id number]  Insurance Carrier: [facility]  Site Name: Cristal Man Site ID: [id number]  Site Address: [address] [place] [address]      Primary Diagnosis Code: O09.891 Description: Supervision of other high risk pregnancies, first trimester  Secondary Diagnosis Code: O99.210 Description: Obesity complicating pregnancy, unspecified trimester  Date of Service: Not provided    CPT Code: 74943 Description: Ob us, follow-up, per fetus  Case Number: [id number]  Review Date: 10/24/2022 11:17:11 AM  Expiration Date: N/A

## 2022-10-25 ENCOUNTER — TELEPHONE (OUTPATIENT)
Dept: OBGYN CLINIC | Facility: CLINIC | Age: 32
End: 2022-10-25

## 2022-10-25 NOTE — TELEPHONE ENCOUNTER
LMTCB    ----- Message from Harjit Agustin MD sent at 10/24/2022  6:17 PM CDT -----  Low ferritin, pt to start Fe supplement PO daily

## 2022-10-25 NOTE — TELEPHONE ENCOUNTER
----- Message from Mikala Amin MD sent at 10/24/2022  6:17 PM CDT -----  Low ferritin, pt to start Fe supplement PO daily

## 2022-11-08 ENCOUNTER — HOSPITAL ENCOUNTER (OUTPATIENT)
Age: 32
Discharge: HOME | End: 2022-11-08
Payer: MEDICAID

## 2022-11-08 VITALS
OXYGEN SATURATION: 98 % | HEART RATE: 91 BPM | DIASTOLIC BLOOD PRESSURE: 63 MMHG | SYSTOLIC BLOOD PRESSURE: 125 MMHG | TEMPERATURE: 97 F | RESPIRATION RATE: 20 BRPM

## 2022-11-08 DIAGNOSIS — Z20.828 EXPOSURE TO INFLUENZA: ICD-10-CM

## 2022-11-08 DIAGNOSIS — Z02.89 ENCOUNTER TO OBTAIN EXCUSE FROM WORK: Primary | ICD-10-CM

## 2022-11-08 LAB
POCT INFLUENZA A: NEGATIVE
POCT INFLUENZA B: NEGATIVE

## 2022-11-08 PROCEDURE — 87502 INFLUENZA DNA AMP PROBE: CPT | Performed by: PHYSICIAN ASSISTANT

## 2022-11-08 PROCEDURE — 99212 OFFICE O/P EST SF 10 MIN: CPT | Performed by: PHYSICIAN ASSISTANT

## 2022-11-09 ENCOUNTER — TELEPHONE (OUTPATIENT)
Dept: OBGYN CLINIC | Facility: CLINIC | Age: 32
End: 2022-11-09

## 2022-11-09 ENCOUNTER — PATIENT MESSAGE (OUTPATIENT)
Dept: OBGYN CLINIC | Facility: CLINIC | Age: 32
End: 2022-11-09

## 2022-11-09 NOTE — TELEPHONE ENCOUNTER
Patient's son and boyfriend have tested positive for the flu. She has tested negative but is concerned she will eventually get it. She is hoping for some guidance. Please advise.

## 2022-11-14 ENCOUNTER — ROUTINE PRENATAL (OUTPATIENT)
Dept: OBGYN CLINIC | Facility: CLINIC | Age: 32
End: 2022-11-14
Payer: MEDICAID

## 2022-11-14 VITALS — BODY MASS INDEX: 34 KG/M2 | SYSTOLIC BLOOD PRESSURE: 102 MMHG | WEIGHT: 212 LBS | DIASTOLIC BLOOD PRESSURE: 71 MMHG

## 2022-11-14 DIAGNOSIS — Z34.83 ENCOUNTER FOR SUPERVISION OF OTHER NORMAL PREGNANCY IN THIRD TRIMESTER: Primary | ICD-10-CM

## 2022-11-14 LAB
APPEARANCE: CLEAR
BILIRUBIN: NEGATIVE
GLUCOSE (URINE DIPSTICK): NEGATIVE MG/DL
MULTISTIX LOT#: ABNORMAL NUMERIC
NITRITE, URINE: NEGATIVE
OCCULT BLOOD: NEGATIVE
PH, URINE: 6 (ref 4.5–8)
PROTEIN (URINE DIPSTICK): NEGATIVE MG/DL
SPECIFIC GRAVITY: 1.02 (ref 1–1.03)
URINE-COLOR: YELLOW
UROBILINOGEN,SEMI-QN: 0.2 MG/DL (ref 0–1.9)

## 2022-11-14 PROCEDURE — 81003 URINALYSIS AUTO W/O SCOPE: CPT | Performed by: OBSTETRICS & GYNECOLOGY

## 2022-11-14 PROCEDURE — 3074F SYST BP LT 130 MM HG: CPT | Performed by: OBSTETRICS & GYNECOLOGY

## 2022-11-14 PROCEDURE — 0502F SUBSEQUENT PRENATAL CARE: CPT | Performed by: OBSTETRICS & GYNECOLOGY

## 2022-11-14 PROCEDURE — 3078F DIAST BP <80 MM HG: CPT | Performed by: OBSTETRICS & GYNECOLOGY

## 2022-11-14 NOTE — PROGRESS NOTES
Discussed the benefits of Flu vaccine and Tdap. Patient declines the Flu vaccine due to previous bad reaction to the vaccine. Patient will think about Tdap and decide by next visit. Patient has OB ultrasound with MFM tomorrow. Taking Iron but causing upset stomach. Advised to take Iron every other day instead of daily.

## 2022-11-15 ENCOUNTER — HOSPITAL ENCOUNTER (OUTPATIENT)
Dept: PERINATAL CARE | Facility: HOSPITAL | Age: 32
Discharge: HOME | End: 2022-11-15
Attending: OBSTETRICS & GYNECOLOGY
Payer: MEDICAID

## 2022-11-15 VITALS
DIASTOLIC BLOOD PRESSURE: 60 MMHG | SYSTOLIC BLOOD PRESSURE: 100 MMHG | BODY MASS INDEX: 34 KG/M2 | WEIGHT: 212 LBS | HEART RATE: 87 BPM

## 2022-11-15 DIAGNOSIS — O09.891 MEDICATION EXPOSURE DURING FIRST TRIMESTER OF PREGNANCY: ICD-10-CM

## 2022-11-15 DIAGNOSIS — O99.213 OBESITY AFFECTING PREGNANCY IN THIRD TRIMESTER: ICD-10-CM

## 2022-11-15 DIAGNOSIS — O09.891 MEDICATION EXPOSURE DURING FIRST TRIMESTER OF PREGNANCY: Primary | ICD-10-CM

## 2022-11-15 PROCEDURE — 76819 FETAL BIOPHYS PROFIL W/O NST: CPT

## 2022-11-15 PROCEDURE — 76816 OB US FOLLOW-UP PER FETUS: CPT | Performed by: OBSTETRICS & GYNECOLOGY

## 2022-11-30 ENCOUNTER — HOSPITAL ENCOUNTER (EMERGENCY)
Facility: HOSPITAL | Age: 32
Discharge: HOME | End: 2022-11-30
Attending: EMERGENCY MEDICINE
Payer: MEDICAID

## 2022-11-30 ENCOUNTER — APPOINTMENT (OUTPATIENT)
Dept: CT IMAGING | Facility: HOSPITAL | Age: 32
End: 2022-11-30
Attending: EMERGENCY MEDICINE
Payer: MEDICAID

## 2022-11-30 ENCOUNTER — TELEPHONE (OUTPATIENT)
Dept: OBGYN CLINIC | Facility: CLINIC | Age: 32
End: 2022-11-30

## 2022-11-30 VITALS
HEART RATE: 71 BPM | DIASTOLIC BLOOD PRESSURE: 62 MMHG | WEIGHT: 207 LBS | TEMPERATURE: 98 F | BODY MASS INDEX: 33.27 KG/M2 | SYSTOLIC BLOOD PRESSURE: 106 MMHG | RESPIRATION RATE: 13 BRPM | HEIGHT: 66 IN | OXYGEN SATURATION: 98 %

## 2022-11-30 DIAGNOSIS — R51.9 NONINTRACTABLE HEADACHE, UNSPECIFIED CHRONICITY PATTERN, UNSPECIFIED HEADACHE TYPE: Primary | ICD-10-CM

## 2022-11-30 LAB
ANION GAP SERPL CALC-SCNC: 8 MMOL/L (ref 0–18)
ATRIAL RATE: 90 BPM
BASOPHILS # BLD AUTO: 0.02 X10(3) UL (ref 0–0.2)
BASOPHILS NFR BLD AUTO: 0.2 %
BILIRUB UR QL CFM: NEGATIVE
BUN BLD-MCNC: 6 MG/DL (ref 7–18)
BUN/CREAT SERPL: 12.8 (ref 10–20)
CALCIUM BLD-MCNC: 8.9 MG/DL (ref 8.5–10.1)
CHLORIDE SERPL-SCNC: 109 MMOL/L (ref 98–112)
CLARITY UR: CLEAR
CO2 SERPL-SCNC: 21 MMOL/L (ref 21–32)
COLOR UR: YELLOW
CREAT BLD-MCNC: 0.47 MG/DL
DEPRECATED RDW RBC AUTO: 42.5 FL (ref 35.1–46.3)
EOSINOPHIL # BLD AUTO: 0.07 X10(3) UL (ref 0–0.7)
EOSINOPHIL NFR BLD AUTO: 0.7 %
ERYTHROCYTE [DISTWIDTH] IN BLOOD BY AUTOMATED COUNT: 12.6 % (ref 11–15)
FLUAV + FLUBV RNA SPEC NAA+PROBE: NEGATIVE
FLUAV + FLUBV RNA SPEC NAA+PROBE: NEGATIVE
GFR SERPLBLD BASED ON 1.73 SQ M-ARVRAT: 130 ML/MIN/1.73M2 (ref 60–?)
GLUCOSE BLD-MCNC: 79 MG/DL (ref 70–99)
GLUCOSE UR-MCNC: NEGATIVE MG/DL
HCT VFR BLD AUTO: 38.1 %
HGB BLD-MCNC: 12.3 G/DL
HGB UR QL STRIP.AUTO: NEGATIVE
IMM GRANULOCYTES # BLD AUTO: 0.04 X10(3) UL (ref 0–1)
IMM GRANULOCYTES NFR BLD: 0.4 %
KETONES UR-MCNC: NEGATIVE MG/DL
LYMPHOCYTES # BLD AUTO: 2.38 X10(3) UL (ref 1–4)
LYMPHOCYTES NFR BLD AUTO: 25.2 %
MCH RBC QN AUTO: 29.7 PG (ref 26–34)
MCHC RBC AUTO-ENTMCNC: 32.3 G/DL (ref 31–37)
MCV RBC AUTO: 92 FL
MONOCYTES # BLD AUTO: 0.45 X10(3) UL (ref 0.1–1)
MONOCYTES NFR BLD AUTO: 4.8 %
NEUTROPHILS # BLD AUTO: 6.5 X10 (3) UL (ref 1.5–7.7)
NEUTROPHILS # BLD AUTO: 6.5 X10(3) UL (ref 1.5–7.7)
NEUTROPHILS NFR BLD AUTO: 68.7 %
NITRITE UR QL STRIP.AUTO: NEGATIVE
OSMOLALITY SERPL CALC.SUM OF ELEC: 283 MOSM/KG (ref 275–295)
P AXIS: 33 DEGREES
P-R INTERVAL: 172 MS
PH UR: 5.5 [PH] (ref 5–8)
PLATELET # BLD AUTO: 355 10(3)UL (ref 150–450)
POTASSIUM SERPL-SCNC: 3.8 MMOL/L (ref 3.5–5.1)
Q-T INTERVAL: 344 MS
QRS DURATION: 78 MS
QTC CALCULATION (BEZET): 420 MS
R AXIS: 32 DEGREES
RBC # BLD AUTO: 4.14 X10(6)UL
RSV RNA SPEC NAA+PROBE: NEGATIVE
SARS-COV-2 RNA RESP QL NAA+PROBE: NOT DETECTED
SODIUM SERPL-SCNC: 138 MMOL/L (ref 136–145)
SP GR UR STRIP: >=1.03 (ref 1–1.03)
T AXIS: 29 DEGREES
UROBILINOGEN UR STRIP-ACNC: 0.2
VENTRICULAR RATE: 90 BPM
WBC # BLD AUTO: 9.5 X10(3) UL (ref 4–11)

## 2022-11-30 PROCEDURE — 87086 URINE CULTURE/COLONY COUNT: CPT | Performed by: EMERGENCY MEDICINE

## 2022-11-30 PROCEDURE — 80048 BASIC METABOLIC PNL TOTAL CA: CPT | Performed by: EMERGENCY MEDICINE

## 2022-11-30 PROCEDURE — 99285 EMERGENCY DEPT VISIT HI MDM: CPT

## 2022-11-30 PROCEDURE — 0241U SARS-COV-2/FLU A AND B/RSV BY PCR (GENEXPERT): CPT | Performed by: EMERGENCY MEDICINE

## 2022-11-30 PROCEDURE — 85025 COMPLETE CBC W/AUTO DIFF WBC: CPT | Performed by: EMERGENCY MEDICINE

## 2022-11-30 PROCEDURE — 96360 HYDRATION IV INFUSION INIT: CPT

## 2022-11-30 PROCEDURE — 93010 ELECTROCARDIOGRAM REPORT: CPT | Performed by: EMERGENCY MEDICINE

## 2022-11-30 PROCEDURE — 96361 HYDRATE IV INFUSION ADD-ON: CPT

## 2022-11-30 PROCEDURE — 81001 URINALYSIS AUTO W/SCOPE: CPT | Performed by: EMERGENCY MEDICINE

## 2022-11-30 PROCEDURE — 93005 ELECTROCARDIOGRAM TRACING: CPT

## 2022-11-30 PROCEDURE — 81015 MICROSCOPIC EXAM OF URINE: CPT | Performed by: EMERGENCY MEDICINE

## 2022-11-30 PROCEDURE — 70450 CT HEAD/BRAIN W/O DYE: CPT | Performed by: EMERGENCY MEDICINE

## 2022-11-30 RX ORDER — ACETAMINOPHEN 500 MG
1000 TABLET ORAL ONCE
Status: COMPLETED | OUTPATIENT
Start: 2022-11-30 | End: 2022-11-30

## 2022-11-30 NOTE — TELEPHONE ENCOUNTER
OB History     T1    L1    SAB4  IAB0  Ectopic1  Multiple0  Live Births1   34w 4d    Pt states she was standing for a period at work last night and had an episode of feeling sudden weakness, dizziness, and like she was going to faint if she didn't sit down. Since that time she has had a headache, weakness, and general muscle fatigue. Pt last took Tylenol yesterday around 2300. States Tylenol didn't help her headache, she was up on and off through the night. +FM per pt, denies bleeding, or contractions. States she has gastric issues so has regular abdominal discomfort, but no new pain. States she has mild nausea but is tolerating fluids and light food with no vomiting. Denies any new swelling, states she has mild swelling with the pregnancy. Denies history of elevated blood pressures. Pt voice sounds weak and tired. Pt advised to go to ED for evaluation. Pt agrees with plan and VU.

## 2022-11-30 NOTE — ED QUICK NOTES
Patient AOx4. To ED for complaints of presyncopal episode yesterday and feelings of being weak since. Pregnant, 34 weeks gestation. Denies vaginal bleeding. FHTs normal. Last US few weeks ago. Denies any complications with pregnancy. VSS.  Hui vences

## 2022-11-30 NOTE — ED QUICK NOTES
Patient ambulatory to bathroom with slow steady gait. Reports improvement in symptoms since arrival. SBP 100s. Pt keeps bending arm, interrupting fluid bolus. Advised to keep arm straight.

## 2022-11-30 NOTE — ED INITIAL ASSESSMENT (HPI)
Lazaro Oneal arrived through triage for c/o dizzy / \"near-fainting spell\" last night. C/o diffuse headache and generalized weakness. Denies cough or recent illness. , 34 weeks pregnant. Denies abdominal or back discomfort, denies vaginal discharge or bleeding.

## 2022-12-01 ENCOUNTER — NURSE ONLY (OUTPATIENT)
Dept: OBGYN CLINIC | Facility: CLINIC | Age: 32
End: 2022-12-01
Payer: MEDICAID

## 2022-12-01 ENCOUNTER — TELEPHONE (OUTPATIENT)
Dept: OBGYN CLINIC | Facility: CLINIC | Age: 32
End: 2022-12-01

## 2022-12-01 VITALS — SYSTOLIC BLOOD PRESSURE: 112 MMHG | DIASTOLIC BLOOD PRESSURE: 62 MMHG

## 2022-12-01 DIAGNOSIS — Z01.30 BLOOD PRESSURE CHECK: Primary | ICD-10-CM

## 2022-12-01 NOTE — TELEPHONE ENCOUNTER
OB History     T1    L1    SAB4  IAB0  Ectopic1  Multiple0  Live Births1   34w5d    Patient name and  verified. Patient seen in ED yesterday for HA and low blood pressure. States no improvement today. +FM, denies VB, LOF , or contractions. Unable to check blood pressure at home. Scheduled for RN visit for BP check. Advised if symptoms worsen prior to appt time patient to go directly to ED.

## 2022-12-01 NOTE — TELEPHONE ENCOUNTER
Pt went to ER on yesterday, had low BP. Pt has apt PN 12/5 with ASJ.  Pt needs to know if she needs to be seen sooner

## 2022-12-05 ENCOUNTER — LAB ENCOUNTER (OUTPATIENT)
Dept: LAB | Facility: HOSPITAL | Age: 32
End: 2022-12-05
Attending: OBSTETRICS & GYNECOLOGY
Payer: MEDICAID

## 2022-12-05 ENCOUNTER — ROUTINE PRENATAL (OUTPATIENT)
Dept: OBGYN CLINIC | Facility: CLINIC | Age: 32
End: 2022-12-05
Payer: MEDICAID

## 2022-12-05 VITALS — SYSTOLIC BLOOD PRESSURE: 128 MMHG | WEIGHT: 214.81 LBS | DIASTOLIC BLOOD PRESSURE: 70 MMHG | BODY MASS INDEX: 35 KG/M2

## 2022-12-05 DIAGNOSIS — Z34.83 ENCOUNTER FOR SUPERVISION OF OTHER NORMAL PREGNANCY IN THIRD TRIMESTER: Primary | ICD-10-CM

## 2022-12-05 DIAGNOSIS — Z34.83 ENCOUNTER FOR SUPERVISION OF OTHER NORMAL PREGNANCY IN THIRD TRIMESTER: ICD-10-CM

## 2022-12-05 LAB
APPEARANCE: CLEAR
BASOPHILS # BLD AUTO: 0.03 X10(3) UL (ref 0–0.2)
BASOPHILS NFR BLD AUTO: 0.3 %
DEPRECATED HBV CORE AB SER IA-ACNC: 7.7 NG/ML
DEPRECATED RDW RBC AUTO: 43 FL (ref 35.1–46.3)
EOSINOPHIL # BLD AUTO: 0.06 X10(3) UL (ref 0–0.7)
EOSINOPHIL NFR BLD AUTO: 0.7 %
ERYTHROCYTE [DISTWIDTH] IN BLOOD BY AUTOMATED COUNT: 12.7 % (ref 11–15)
GLUCOSE (URINE DIPSTICK): NEGATIVE MG/DL
HCT VFR BLD AUTO: 38.3 %
HGB BLD-MCNC: 12.3 G/DL
IMM GRANULOCYTES # BLD AUTO: 0.06 X10(3) UL (ref 0–1)
IMM GRANULOCYTES NFR BLD: 0.7 %
LYMPHOCYTES # BLD AUTO: 2.04 X10(3) UL (ref 1–4)
LYMPHOCYTES NFR BLD AUTO: 23.4 %
MCH RBC QN AUTO: 29.8 PG (ref 26–34)
MCHC RBC AUTO-ENTMCNC: 32.1 G/DL (ref 31–37)
MCV RBC AUTO: 92.7 FL
MONOCYTES # BLD AUTO: 0.38 X10(3) UL (ref 0.1–1)
MONOCYTES NFR BLD AUTO: 4.4 %
MULTISTIX LOT#: ABNORMAL NUMERIC
NEUTROPHILS # BLD AUTO: 6.16 X10 (3) UL (ref 1.5–7.7)
NEUTROPHILS # BLD AUTO: 6.16 X10(3) UL (ref 1.5–7.7)
NEUTROPHILS NFR BLD AUTO: 70.5 %
NITRITE, URINE: NEGATIVE
OCCULT BLOOD: NEGATIVE
PH, URINE: 6 (ref 4.5–8)
PLATELET # BLD AUTO: 362 10(3)UL (ref 150–450)
PROTEIN (URINE DIPSTICK): 30 MG/DL
RBC # BLD AUTO: 4.13 X10(6)UL
SPECIFIC GRAVITY: 1.02 (ref 1–1.03)
URINE-COLOR: YELLOW
UROBILINOGEN,SEMI-QN: 0.2 MG/DL (ref 0–1.9)
WBC # BLD AUTO: 8.7 X10(3) UL (ref 4–11)

## 2022-12-05 PROCEDURE — 85025 COMPLETE CBC W/AUTO DIFF WBC: CPT

## 2022-12-05 PROCEDURE — 0502F SUBSEQUENT PRENATAL CARE: CPT | Performed by: OBSTETRICS & GYNECOLOGY

## 2022-12-05 PROCEDURE — 3074F SYST BP LT 130 MM HG: CPT | Performed by: OBSTETRICS & GYNECOLOGY

## 2022-12-05 PROCEDURE — 3078F DIAST BP <80 MM HG: CPT | Performed by: OBSTETRICS & GYNECOLOGY

## 2022-12-05 PROCEDURE — 87389 HIV-1 AG W/HIV-1&-2 AB AG IA: CPT

## 2022-12-05 PROCEDURE — 36415 COLL VENOUS BLD VENIPUNCTURE: CPT

## 2022-12-05 PROCEDURE — 86780 TREPONEMA PALLIDUM: CPT

## 2022-12-05 PROCEDURE — 82728 ASSAY OF FERRITIN: CPT

## 2022-12-05 PROCEDURE — 81003 URINALYSIS AUTO W/O SCOPE: CPT | Performed by: OBSTETRICS & GYNECOLOGY

## 2022-12-05 NOTE — PROGRESS NOTES
Patient reports almost having a syncopal episode at work. Went to ER. Feeling better now. Precautions reviewed. To do Labs today.

## 2022-12-06 ENCOUNTER — TELEPHONE (OUTPATIENT)
Dept: OBGYN CLINIC | Facility: CLINIC | Age: 32
End: 2022-12-06

## 2022-12-06 NOTE — TELEPHONE ENCOUNTER
Pt called and informed of results and recommendations. Pt voices understanding. Iron infusion orders faxed to the [facility] infusion center. Pt placed in infusion follow up book. ----- Message from Praveena Art MD sent at 12/5/2022  4:29 PM CST -----  Ferritin is still Low. Arrange of Iron Transfusions ASAP. Call patient.

## 2022-12-07 PROBLEM — O99.012 ANEMIA COMPLICATING PREGNANCY IN SECOND TRIMESTER: Status: ACTIVE | Noted: 2022-12-07

## 2022-12-07 PROBLEM — O99.012 ANEMIA COMPLICATING PREGNANCY IN SECOND TRIMESTER (HCC): Status: ACTIVE | Noted: 2022-12-07

## 2022-12-07 LAB — T PALLIDUM AB SER QL: NEGATIVE

## 2022-12-13 ENCOUNTER — TELEPHONE (OUTPATIENT)
Dept: HEMATOLOGY/ONCOLOGY | Facility: HOSPITAL | Age: 32
End: 2022-12-13

## 2022-12-13 NOTE — TELEPHONE ENCOUNTER
Called patient to scheduled infusion ordered by Dr Christel Becerra. Patient did not answer but a detail message was left on voicemail.

## 2022-12-15 ENCOUNTER — ROUTINE PRENATAL (OUTPATIENT)
Dept: OBGYN CLINIC | Facility: CLINIC | Age: 32
End: 2022-12-15
Payer: MEDICAID

## 2022-12-15 ENCOUNTER — HOSPITAL ENCOUNTER (OUTPATIENT)
Facility: HOSPITAL | Age: 32
Discharge: HOME | End: 2022-12-15
Attending: OBSTETRICS & GYNECOLOGY | Admitting: OBSTETRICS & GYNECOLOGY
Payer: MEDICAID

## 2022-12-15 VITALS — DIASTOLIC BLOOD PRESSURE: 51 MMHG | HEART RATE: 88 BPM | SYSTOLIC BLOOD PRESSURE: 100 MMHG

## 2022-12-15 VITALS — SYSTOLIC BLOOD PRESSURE: 112 MMHG | WEIGHT: 219 LBS | DIASTOLIC BLOOD PRESSURE: 76 MMHG | BODY MASS INDEX: 35 KG/M2

## 2022-12-15 DIAGNOSIS — Z34.83 ENCOUNTER FOR SUPERVISION OF OTHER NORMAL PREGNANCY IN THIRD TRIMESTER: Primary | ICD-10-CM

## 2022-12-15 LAB
ALBUMIN SERPL-MCNC: 2.6 G/DL (ref 3.4–5)
ALBUMIN/GLOB SERPL: 0.6 {RATIO} (ref 1–2)
ALP LIVER SERPL-CCNC: 154 U/L
ALT SERPL-CCNC: 28 U/L
ANION GAP SERPL CALC-SCNC: 7 MMOL/L (ref 0–18)
APPEARANCE: CLEAR
AST SERPL-CCNC: 12 U/L (ref 15–37)
BASOPHILS # BLD AUTO: 0.03 X10(3) UL (ref 0–0.2)
BASOPHILS NFR BLD AUTO: 0.3 %
BILIRUB SERPL-MCNC: 0.2 MG/DL (ref 0.1–2)
BILIRUBIN: NEGATIVE
BUN BLD-MCNC: 7 MG/DL (ref 7–18)
BUN/CREAT SERPL: 14.3 (ref 10–20)
CALCIUM BLD-MCNC: 8.9 MG/DL (ref 8.5–10.1)
CHLORIDE SERPL-SCNC: 108 MMOL/L (ref 98–112)
CO2 SERPL-SCNC: 21 MMOL/L (ref 21–32)
CREAT BLD-MCNC: 0.49 MG/DL
CREAT UR-SCNC: 331 MG/DL
DEPRECATED RDW RBC AUTO: 42.1 FL (ref 35.1–46.3)
EOSINOPHIL # BLD AUTO: 0.22 X10(3) UL (ref 0–0.7)
EOSINOPHIL NFR BLD AUTO: 1.9 %
ERYTHROCYTE [DISTWIDTH] IN BLOOD BY AUTOMATED COUNT: 12.6 % (ref 11–15)
FASTING STATUS PATIENT QL REPORTED: NO
GFR SERPLBLD BASED ON 1.73 SQ M-ARVRAT: 128 ML/MIN/1.73M2 (ref 60–?)
GLOBULIN PLAS-MCNC: 4.6 G/DL (ref 2.8–4.4)
GLUCOSE (URINE DIPSTICK): NEGATIVE MG/DL
GLUCOSE BLD-MCNC: 111 MG/DL (ref 70–99)
HCT VFR BLD AUTO: 37.2 %
HGB BLD-MCNC: 12.3 G/DL
IMM GRANULOCYTES # BLD AUTO: 0.05 X10(3) UL (ref 0–1)
IMM GRANULOCYTES NFR BLD: 0.4 %
LYMPHOCYTES # BLD AUTO: 2.88 X10(3) UL (ref 1–4)
LYMPHOCYTES NFR BLD AUTO: 24.7 %
MCH RBC QN AUTO: 30.3 PG (ref 26–34)
MCHC RBC AUTO-ENTMCNC: 33.1 G/DL (ref 31–37)
MCV RBC AUTO: 91.6 FL
MONOCYTES # BLD AUTO: 0.6 X10(3) UL (ref 0.1–1)
MONOCYTES NFR BLD AUTO: 5.2 %
MULTISTIX LOT#: ABNORMAL NUMERIC
NEUTROPHILS # BLD AUTO: 7.86 X10 (3) UL (ref 1.5–7.7)
NEUTROPHILS # BLD AUTO: 7.86 X10(3) UL (ref 1.5–7.7)
NEUTROPHILS NFR BLD AUTO: 67.5 %
NITRITE, URINE: NEGATIVE
OCCULT BLOOD: NEGATIVE
OSMOLALITY SERPL CALC.SUM OF ELEC: 281 MOSM/KG (ref 275–295)
PH, URINE: 6.5 (ref 4.5–8)
PLATELET # BLD AUTO: 372 10(3)UL (ref 150–450)
POTASSIUM SERPL-SCNC: 3.5 MMOL/L (ref 3.5–5.1)
PROT SERPL-MCNC: 7.2 G/DL (ref 6.4–8.2)
PROT UR-MCNC: 49.9 MG/DL
PROT/CREAT UR-RTO: 0.15
PROTEIN (URINE DIPSTICK): 30 MG/DL
RBC # BLD AUTO: 4.06 X10(6)UL
SODIUM SERPL-SCNC: 136 MMOL/L (ref 136–145)
SPECIFIC GRAVITY: 1.02 (ref 1–1.03)
URINE-COLOR: YELLOW
UROBILINOGEN,SEMI-QN: 0.2 MG/DL (ref 0–1.9)
WBC # BLD AUTO: 11.6 X10(3) UL (ref 4–11)

## 2022-12-15 PROCEDURE — 3074F SYST BP LT 130 MM HG: CPT | Performed by: OBSTETRICS & GYNECOLOGY

## 2022-12-15 PROCEDURE — 99212 OFFICE O/P EST SF 10 MIN: CPT | Performed by: OBSTETRICS & GYNECOLOGY

## 2022-12-15 PROCEDURE — 81002 URINALYSIS NONAUTO W/O SCOPE: CPT | Performed by: OBSTETRICS & GYNECOLOGY

## 2022-12-15 PROCEDURE — 59025 FETAL NON-STRESS TEST: CPT | Performed by: OBSTETRICS & GYNECOLOGY

## 2022-12-15 PROCEDURE — 0502F SUBSEQUENT PRENATAL CARE: CPT | Performed by: OBSTETRICS & GYNECOLOGY

## 2022-12-15 PROCEDURE — 3078F DIAST BP <80 MM HG: CPT | Performed by: OBSTETRICS & GYNECOLOGY

## 2022-12-15 NOTE — PROGRESS NOTES
Patient states she is just not feeling well. Patient is vague with symptoms. + Protein in urine. Will send to Triage for evaluation and R/O Pre-Eclampsia. Patient scheduled for Iron infusion starting tomorrow. GBBS Culture Done.

## 2022-12-16 ENCOUNTER — TELEPHONE (OUTPATIENT)
Dept: HEMATOLOGY/ONCOLOGY | Facility: HOSPITAL | Age: 32
End: 2022-12-16

## 2022-12-16 NOTE — TELEPHONE ENCOUNTER
Called patient regarding her missed appointment.   Patient did not answer and did leave a message on voicemail to call and reschedule her appointment

## 2022-12-16 NOTE — TRIAGE
[facility] HOSP - [facility]      Triage Note    Vladimir Prasad Patient Status:  Outpatient    3/11/1990 MRN [id number]   Location [address] Attending David Stevens MD   Hosp Day # 0 PCP Henrey Lai, MD Byrd Cranker: [id number]  Estimated Date of Delivery: 23  Gestation: 36w5d    Chief Complaint    R/o Pih         Allergies:    Penicillins             HIVES    Orders Placed This Encounter      Comp Metabolic Panel (14)      CBC With Differential With Platelet      Protein/Creatinine Ratio, Urine Random      Lab Results   Component Value Date    WBC 11.6 (H) 12/15/2022    HGB 12.3 12/15/2022    HCT 37.2 12/15/2022    .0 12/15/2022    CREATSERUM 0.49 (L) 12/15/2022    BUN 7 12/15/2022     12/15/2022    K 3.5 12/15/2022     12/15/2022    CO2 21.0 12/15/2022     (H) 12/15/2022    CA 8.9 12/15/2022    ALB 2.6 (L) 12/15/2022    ALKPHO 154 (H) 12/15/2022    BILT 0.2 12/15/2022    TP 7.2 12/15/2022    AST 12 (L) 12/15/2022    ALT 28 12/15/2022    TSH 1.390 2022    DDIMER 0.43 2022    TROPHS 4 2022       Clinitek UA  Lab Results   Component Value Date    GLUUR Negative 2022    SPECGRAVITY 1.025 12/15/2022    URINECUL No Growth at 18-24 hrs. 2022       UA  Lab Results   Component Value Date    COLORUR Yellow 2022    CLARITY Clear 2022    SPECGRAVITY 1.025 12/15/2022    PROUR Trace (A) 2022    GLUUR Negative 2022    KETUR Negative 2022    BILUR  2022      Comment:      Refer to ictotest confirmation for bilirubin result.       BLOODURINE Negative 2022    NITRITE Negative 12/15/2022    UROBILINOGEN 0.2 2022    LEUUR Trace (A) 2022        12/15/22  1730 12/15/22  1745 12/15/22  1800 12/15/22  1815   BP: 94/59 (!) 86/62 96/54 100/51   Pulse: 94 98 88 88       NST  Variability: Moderate           Accelerations: Yes           Decelerations: None            Baseline: 135 BPM           Uterine Irritability: No           Contractions: Not present                                        Acoustic Stimulator: No           Nonstress Test Interpretation: Reactive           Nonstress Test Second Interpretation: Reactive          FHR Category: Category I             Additional Comments       Patient presents with:  R/o Pih:  at 39 5/7 IUP sent to triage from the office for r/o preeclampsia. Pt c/o feeling tired, and just \" not feeling well\". Pt sent from office for rule out PIH due to +1 protein in urine. Pt reports good fetal movement. All labs wnl; and NST reactive. Reported to Dr Carlos Cano in department. Pt dc home with labor precautions and kick counts. Pt with verbalized understanding. Soren Torres RN  12/15/2022 6:34 PM    Physician Evaluation      NST Interpretation: Reactive  Fetal Surveillance:  130's BPM; Fetal heart variability: moderate  Fetal Heart Rate decelerations: none  Fetal Heart Rate accelerations: yes    Disposition:   Discharged    Comments:  A: 28 y.o.  [id number] with Estimated Date of Delivery: 23 and IUP at 36w5d sent for evaluation due to proteinuria and not feeling well. Labs were reviewed and were normal.  NST is reactive.   Blood pressures normal.    Katie Martinez MD, MD  2022  2:45 PM

## 2022-12-16 NOTE — PROGRESS NOTES
Pt is a 28year old female admitted to TR4/TR4-A. Patient presents with:  R/o Pih:  at 39 5/7 IUP sent to triage from the office for r/o preeclampsia. Pt c/o feeling tired, and just \" not feeling well\". Pt is [id number] 36w5d intra-uterine pregnancy. History obtained, consents signed. Oriented to room, staff, and plan of care.

## 2022-12-17 LAB — GROUP B STREP BY PCR FOR PCR OVT: NEGATIVE

## 2022-12-20 ENCOUNTER — OFFICE VISIT (OUTPATIENT)
Dept: HEMATOLOGY/ONCOLOGY | Facility: HOSPITAL | Age: 32
End: 2022-12-20
Attending: OBSTETRICS & GYNECOLOGY
Payer: MEDICAID

## 2022-12-20 VITALS
DIASTOLIC BLOOD PRESSURE: 54 MMHG | TEMPERATURE: 98 F | OXYGEN SATURATION: 97 % | RESPIRATION RATE: 16 BRPM | SYSTOLIC BLOOD PRESSURE: 115 MMHG | HEART RATE: 82 BPM

## 2022-12-20 DIAGNOSIS — O99.012 ANEMIA COMPLICATING PREGNANCY IN SECOND TRIMESTER: Primary | ICD-10-CM

## 2022-12-20 PROCEDURE — 96365 THER/PROPH/DIAG IV INF INIT: CPT

## 2022-12-20 PROCEDURE — 96366 THER/PROPH/DIAG IV INF ADDON: CPT

## 2022-12-20 NOTE — PROGRESS NOTES
Di Conklin is here for Venofer 300mg infusion 1 of 3. She states that she is experiencing a lot of fatigue. I reviewed signs and symptoms of reaction to report during infusion and during the 30 minute observation period. Tammi tolerated the Venofer infusion well and vital signs stable after the 30 minute observation period.

## 2022-12-28 ENCOUNTER — TELEPHONE (OUTPATIENT)
Dept: HEMATOLOGY/ONCOLOGY | Facility: HOSPITAL | Age: 32
End: 2022-12-28

## 2023-01-02 ENCOUNTER — ANESTHESIA EVENT (OUTPATIENT)
Dept: OBGYN UNIT | Facility: HOSPITAL | Age: 33
End: 2023-01-02
Payer: MEDICAID

## 2023-01-02 ENCOUNTER — HOSPITAL ENCOUNTER (INPATIENT)
Facility: HOSPITAL | Age: 33
LOS: 2 days | Discharge: HOME | End: 2023-01-04
Attending: OBSTETRICS & GYNECOLOGY | Admitting: OBSTETRICS & GYNECOLOGY
Payer: MEDICAID

## 2023-01-02 ENCOUNTER — ANESTHESIA (OUTPATIENT)
Dept: OBGYN UNIT | Facility: HOSPITAL | Age: 33
End: 2023-01-02
Payer: MEDICAID

## 2023-01-02 PROBLEM — Z34.90 PREGNANCY (HCC): Status: ACTIVE | Noted: 2023-01-02

## 2023-01-02 PROBLEM — Z34.90 PREGNANCY: Status: ACTIVE | Noted: 2023-01-02

## 2023-01-02 LAB
ANTIBODY SCREEN: NEGATIVE
BASOPHILS # BLD AUTO: 0.03 X10(3) UL (ref 0–0.2)
BASOPHILS NFR BLD AUTO: 0.2 %
DEPRECATED HBV CORE AB SER IA-ACNC: 75 NG/ML
DEPRECATED RDW RBC AUTO: 43.3 FL (ref 35.1–46.3)
EOSINOPHIL # BLD AUTO: 0.13 X10(3) UL (ref 0–0.7)
EOSINOPHIL NFR BLD AUTO: 1 %
ERYTHROCYTE [DISTWIDTH] IN BLOOD BY AUTOMATED COUNT: 13.3 % (ref 11–15)
FETAL SCREEN RESULT: NEGATIVE
HCT VFR BLD AUTO: 37.4 %
HGB BLD-MCNC: 12.9 G/DL
IMM GRANULOCYTES # BLD AUTO: 0.07 X10(3) UL (ref 0–1)
IMM GRANULOCYTES NFR BLD: 0.6 %
LYMPHOCYTES # BLD AUTO: 3.97 X10(3) UL (ref 1–4)
LYMPHOCYTES NFR BLD AUTO: 31.4 %
MCH RBC QN AUTO: 30.6 PG (ref 26–34)
MCHC RBC AUTO-ENTMCNC: 34.5 G/DL (ref 31–37)
MCV RBC AUTO: 88.8 FL
MONOCYTES # BLD AUTO: 0.55 X10(3) UL (ref 0.1–1)
MONOCYTES NFR BLD AUTO: 4.4 %
NEUTROPHILS # BLD AUTO: 7.88 X10 (3) UL (ref 1.5–7.7)
NEUTROPHILS # BLD AUTO: 7.88 X10(3) UL (ref 1.5–7.7)
NEUTROPHILS NFR BLD AUTO: 62.4 %
PLATELET # BLD AUTO: 346 10(3)UL (ref 150–450)
RBC # BLD AUTO: 4.21 X10(6)UL
RH BLOOD TYPE: NEGATIVE
SARS-COV-2 RNA RESP QL NAA+PROBE: NOT DETECTED
WBC # BLD AUTO: 12.6 X10(3) UL (ref 4–11)

## 2023-01-02 PROCEDURE — 59409 OBSTETRICAL CARE: CPT | Performed by: OBSTETRICS & GYNECOLOGY

## 2023-01-02 PROCEDURE — 3E0334Z INTRODUCTION OF SERUM, TOXOID AND VACCINE INTO PERIPHERAL VEIN, PERCUTANEOUS APPROACH: ICD-10-PCS | Performed by: OBSTETRICS & GYNECOLOGY

## 2023-01-02 RX ORDER — AMMONIA INHALANTS 0.04 G/.3ML
0.3 INHALANT RESPIRATORY (INHALATION) AS NEEDED
Status: DISCONTINUED | OUTPATIENT
Start: 2023-01-02 | End: 2023-01-04

## 2023-01-02 RX ORDER — AMMONIA INHALANTS 0.04 G/.3ML
0.3 INHALANT RESPIRATORY (INHALATION) AS NEEDED
Status: DISCONTINUED | OUTPATIENT
Start: 2023-01-02 | End: 2023-01-02 | Stop reason: HOSPADM

## 2023-01-02 RX ORDER — DEXTROSE, SODIUM CHLORIDE, SODIUM LACTATE, POTASSIUM CHLORIDE, AND CALCIUM CHLORIDE 5; .6; .31; .03; .02 G/100ML; G/100ML; G/100ML; G/100ML; G/100ML
INJECTION, SOLUTION INTRAVENOUS AS NEEDED
Status: DISCONTINUED | OUTPATIENT
Start: 2023-01-02 | End: 2023-01-02 | Stop reason: HOSPADM

## 2023-01-02 RX ORDER — ACETAMINOPHEN 500 MG
500 TABLET ORAL EVERY 6 HOURS PRN
Status: DISCONTINUED | OUTPATIENT
Start: 2023-01-02 | End: 2023-01-02

## 2023-01-02 RX ORDER — DIAPER,BRIEF,INFANT-TODD,DISP
1 EACH MISCELLANEOUS EVERY 6 HOURS PRN
Status: DISCONTINUED | OUTPATIENT
Start: 2023-01-02 | End: 2023-01-04

## 2023-01-02 RX ORDER — SODIUM CHLORIDE, SODIUM LACTATE, POTASSIUM CHLORIDE, CALCIUM CHLORIDE 600; 310; 30; 20 MG/100ML; MG/100ML; MG/100ML; MG/100ML
INJECTION, SOLUTION INTRAVENOUS CONTINUOUS
Status: DISCONTINUED | OUTPATIENT
Start: 2023-01-02 | End: 2023-01-02 | Stop reason: HOSPADM

## 2023-01-02 RX ORDER — BISACODYL 10 MG
10 SUPPOSITORY, RECTAL RECTAL ONCE AS NEEDED
Status: DISCONTINUED | OUTPATIENT
Start: 2023-01-02 | End: 2023-01-04

## 2023-01-02 RX ORDER — NALBUPHINE HCL 10 MG/ML
2.5 AMPUL (ML) INJECTION
Status: DISCONTINUED | OUTPATIENT
Start: 2023-01-02 | End: 2023-01-02

## 2023-01-02 RX ORDER — DIPHENHYDRAMINE HCL 25 MG
25 CAPSULE ORAL EVERY 6 HOURS PRN
Status: DISCONTINUED | OUTPATIENT
Start: 2023-01-02 | End: 2023-01-04

## 2023-01-02 RX ORDER — TRISODIUM CITRATE DIHYDRATE AND CITRIC ACID MONOHYDRATE 500; 334 MG/5ML; MG/5ML
30 SOLUTION ORAL AS NEEDED
Status: DISCONTINUED | OUTPATIENT
Start: 2023-01-02 | End: 2023-01-02 | Stop reason: HOSPADM

## 2023-01-02 RX ORDER — IBUPROFEN 600 MG/1
600 TABLET ORAL EVERY 6 HOURS
Status: DISCONTINUED | OUTPATIENT
Start: 2023-01-02 | End: 2023-01-04

## 2023-01-02 RX ORDER — BUPIVACAINE HCL/0.9 % NACL/PF 0.25 %
5 PLASTIC BAG, INJECTION (ML) EPIDURAL AS NEEDED
Status: DISCONTINUED | OUTPATIENT
Start: 2023-01-02 | End: 2023-01-02

## 2023-01-02 RX ORDER — LIDOCAINE HYDROCHLORIDE 10 MG/ML
30 INJECTION, SOLUTION EPIDURAL; INFILTRATION; INTRACAUDAL; PERINEURAL ONCE
Status: DISCONTINUED | OUTPATIENT
Start: 2023-01-02 | End: 2023-01-02 | Stop reason: HOSPADM

## 2023-01-02 RX ORDER — ONDANSETRON 2 MG/ML
4 INJECTION INTRAMUSCULAR; INTRAVENOUS EVERY 6 HOURS PRN
Status: DISCONTINUED | OUTPATIENT
Start: 2023-01-02 | End: 2023-01-02 | Stop reason: HOSPADM

## 2023-01-02 RX ORDER — TERBUTALINE SULFATE 1 MG/ML
0.25 INJECTION, SOLUTION SUBCUTANEOUS AS NEEDED
Status: DISCONTINUED | OUTPATIENT
Start: 2023-01-02 | End: 2023-01-02 | Stop reason: HOSPADM

## 2023-01-02 RX ORDER — SIMETHICONE 80 MG
80 TABLET,CHEWABLE ORAL 3 TIMES DAILY PRN
Status: DISCONTINUED | OUTPATIENT
Start: 2023-01-02 | End: 2023-01-04

## 2023-01-02 RX ORDER — CHOLECALCIFEROL (VITAMIN D3) 25 MCG
1 TABLET,CHEWABLE ORAL DAILY
Status: DISCONTINUED | OUTPATIENT
Start: 2023-01-02 | End: 2023-01-04

## 2023-01-02 RX ORDER — DOCUSATE SODIUM 100 MG/1
100 CAPSULE, LIQUID FILLED ORAL
Status: DISCONTINUED | OUTPATIENT
Start: 2023-01-02 | End: 2023-01-04

## 2023-01-02 RX ORDER — LIDOCAINE HYDROCHLORIDE 10 MG/ML
INJECTION, SOLUTION EPIDURAL; INFILTRATION; INTRACAUDAL; PERINEURAL AS NEEDED
Status: DISCONTINUED | OUTPATIENT
Start: 2023-01-02 | End: 2023-01-02 | Stop reason: SURG

## 2023-01-02 RX ORDER — BUPIVACAINE HYDROCHLORIDE 2.5 MG/ML
20 INJECTION, SOLUTION EPIDURAL; INFILTRATION; INTRACAUDAL ONCE
Status: COMPLETED | OUTPATIENT
Start: 2023-01-02 | End: 2023-01-02

## 2023-01-02 RX ORDER — ACETAMINOPHEN 325 MG/1
650 TABLET ORAL EVERY 6 HOURS PRN
Status: DISCONTINUED | OUTPATIENT
Start: 2023-01-02 | End: 2023-01-04

## 2023-01-02 RX ORDER — IBUPROFEN 600 MG/1
600 TABLET ORAL EVERY 6 HOURS PRN
Status: DISCONTINUED | OUTPATIENT
Start: 2023-01-02 | End: 2023-01-02 | Stop reason: HOSPADM

## 2023-01-02 RX ORDER — ACETAMINOPHEN 500 MG
500 TABLET ORAL EVERY 6 HOURS PRN
Status: DISCONTINUED | OUTPATIENT
Start: 2023-01-02 | End: 2023-01-04

## 2023-01-02 RX ORDER — ACETAMINOPHEN 500 MG
1000 TABLET ORAL EVERY 6 HOURS PRN
Status: DISCONTINUED | OUTPATIENT
Start: 2023-01-02 | End: 2023-01-04

## 2023-01-02 RX ORDER — LIDOCAINE HYDROCHLORIDE AND EPINEPHRINE 15; 5 MG/ML; UG/ML
INJECTION, SOLUTION EPIDURAL
Status: COMPLETED | OUTPATIENT
Start: 2023-01-02 | End: 2023-01-02

## 2023-01-02 RX ORDER — ONDANSETRON 2 MG/ML
4 INJECTION INTRAMUSCULAR; INTRAVENOUS EVERY 6 HOURS PRN
Status: DISCONTINUED | OUTPATIENT
Start: 2023-01-02 | End: 2023-01-04

## 2023-01-02 RX ORDER — ACETAMINOPHEN 325 MG/1
TABLET ORAL
Status: COMPLETED
Start: 2023-01-02 | End: 2023-01-02

## 2023-01-02 RX ADMIN — LIDOCAINE HYDROCHLORIDE 3 ML: 10 INJECTION, SOLUTION EPIDURAL; INFILTRATION; INTRACAUDAL; PERINEURAL at 07:49:00

## 2023-01-02 RX ADMIN — BUPIVACAINE HYDROCHLORIDE 3 ML: 2.5 INJECTION, SOLUTION EPIDURAL; INFILTRATION; INTRACAUDAL at 07:58:00

## 2023-01-02 RX ADMIN — LIDOCAINE HYDROCHLORIDE AND EPINEPHRINE 3 ML: 15; 5 INJECTION, SOLUTION EPIDURAL at 07:54:00

## 2023-01-02 NOTE — ANESTHESIA PROCEDURE NOTES
Labor Analgesia    Date/Time: 1/2/2023 7:49 AM  Performed by: Tyrel Palomino MD  Authorized by: Tyrel Palomino MD       General Information and Staff    Start Time:  1/2/2023 7:49 AM  End Time:  1/2/2023 8:03 AM  Anesthesiologist:  Tyrel Palomino MD  Performed by:   Anesthesiologist  Patient Location:  OB  Reason for Block: labor epidural    Preanesthetic Checklist: patient identified, IV checked, site marked, risks and benefits discussed, monitors and equipment checked, pre-op evaluation, timeout performed, anesthesia consent and sterile technique used      Procedure Details    Patient Position:  Sitting  Prep: ChloraPrep    Monitoring:  Heart rate  Approach:  Midline    Epidural Needle    Injection Technique:  ROBERT air  Needle Type:  Tuohy  Needle Gauge:  18 G  Needle Length:  3.5 in  Location:  L2-3    Spinal Needle      Catheter    Catheter Type:  Multi-orifice  Catheter Size:  20 G  Test Dose:  Negative    Assessment      Additional Comments

## 2023-01-02 NOTE — PLAN OF CARE
Problem: BIRTH - VAGINAL/ SECTION  Goal: Fetal and maternal status remain reassuring during the birth process  Description: INTERVENTIONS:  - Monitor vital signs  - Monitor fetal heart rate  - Monitor uterine activity  - Monitor labor progression (vaginal delivery)  - DVT prophylaxis (C/S delivery)  - Surgical antibiotic prophylaxis (C/S delivery)  Outcome: Progressing     Problem: PAIN - ADULT  Goal: Verbalizes/displays adequate comfort level or patient's stated pain goal  Description: INTERVENTIONS:  - Encourage pt to monitor pain and request assistance  - Assess pain using appropriate pain scale  - Administer analgesics based on type and severity of pain and evaluate response  - Implement non-pharmacological measures as appropriate and evaluate response  - Consider cultural and social influences on pain and pain management  - Manage/alleviate anxiety  - Utilize distraction and/or relaxation techniques  - Monitor for opioid side effects  - Notify MD/LIP if interventions unsuccessful or patient reports new pain  - Anticipate increased pain with activity and pre-medicate as appropriate  Outcome: Progressing     Problem: ANXIETY  Goal: Will report anxiety at manageable levels  Description: INTERVENTIONS:  - Administer medication as ordered  - Teach and rehearse alternative coping skills  - Provide emotional support with 1:1 interaction with staff  Outcome: Progressing     Problem: Patient Centered Care  Goal: Patient preferences are identified and integrated in the patient's plan of care  Description: Interventions:  - What would you like us to know as we care for you?   - Provide timely, complete, and accurate information to patient/family  - Incorporate patient and family knowledge, values, beliefs, and cultural backgrounds into the planning and delivery of care  - Encourage patient/family to participate in care and decision-making at the level they choose  - Honor patient and family perspectives and choices  Outcome: Progressing     Problem: Patient/Family Goals  Goal: Patient/Family Long Term Goal  Description: Patient's Long Term Goal:     Interventions:  -   - See additional Care Plan goals for specific interventions  Outcome: Progressing     Problem: Patient/Family Goals  Goal: Patient/Family Long Term Goal  Description: Patient's Long Term Goal: uncomplicated delivery    Interventions:  -   - See additional Care Plan goals for specific interventions  Outcome: Progressing  Goal: Patient/Family Short Term Goal  Description: Patient's Short Term Goal: Adequate pain control    Interventions:   -   - See additional Care Plan goals for specific interventions  Outcome: Progressing

## 2023-01-02 NOTE — PLAN OF CARE
Problem: BIRTH - VAGINAL/ SECTION  Goal: Fetal and maternal status remain reassuring during the birth process  Description: INTERVENTIONS:  - Monitor vital signs  - Monitor fetal heart rate  - Monitor uterine activity  - Monitor labor progression (vaginal delivery)  - DVT prophylaxis (C/S delivery)  - Surgical antibiotic prophylaxis (C/S delivery)  Outcome: Completed     Problem: PAIN - ADULT  Goal: Verbalizes/displays adequate comfort level or patient's stated pain goal  Description: INTERVENTIONS:  - Encourage pt to monitor pain and request assistance  - Assess pain using appropriate pain scale  - Administer analgesics based on type and severity of pain and evaluate response  - Implement non-pharmacological measures as appropriate and evaluate response  - Consider cultural and social influences on pain and pain management  - Manage/alleviate anxiety  - Utilize distraction and/or relaxation techniques  - Monitor for opioid side effects  - Notify MD/LIP if interventions unsuccessful or patient reports new pain  - Anticipate increased pain with activity and pre-medicate as appropriate  Outcome: Progressing     Problem: ANXIETY  Goal: Will report anxiety at manageable levels  Description: INTERVENTIONS:  - Administer medication as ordered  - Teach and rehearse alternative coping skills  - Provide emotional support with 1:1 interaction with staff  Outcome: Progressing     Problem: Patient Centered Care  Goal: Patient preferences are identified and integrated in the patient's plan of care  Description: Interventions:  - What would you like us to know as we care for you?   - Provide timely, complete, and accurate information to patient/family  - Incorporate patient and family knowledge, values, beliefs, and cultural backgrounds into the planning and delivery of care  - Encourage patient/family to participate in care and decision-making at the level they choose  - Honor patient and family perspectives and choices  Outcome: Progressing     Problem: Patient/Family Goals  Goal: Patient/Family Long Term Goal  Description: Patient's Long Term Goal: full recovery    Interventions:  -   - See additional Care Plan goals for specific interventions  Outcome: Progressing  Goal: Patient/Family Short Term Goal  Description: Patient's Short Term Goal: go home with baby    Interventions:   -   - See additional Care Plan goals for specific interventions  Outcome: Progressing     Problem: POSTPARTUM  Goal: Long Term Goal:Experiences normal postpartum course  Description: INTERVENTIONS:  - Assess and monitor vital signs and lab values. - Assess fundus and lochia. - Provide ice/sitz baths for perineum discomfort. - Monitor healing of incision/episiotomy/laceration, and assess for signs and symptoms of infection and hematoma. - Assess bladder function and monitor for bladder distention.  - Provide/instruct/assist with pericare as needed. - Provide VTE prophylaxis as needed. - Monitor bowel function.  - Encourage ambulation and provide assistance as needed. - Assess and monitor emotional status and provide social service/psych resources as needed. - Utilize standard precautions and use personal protective equipment as indicated. Ensure aseptic care of all intravenous lines and invasive tubes/drains.  - Obtain immunization and exposure to communicable diseases history. Outcome: Progressing  Goal: Optimize infant feeding at the breast  Description: INTERVENTIONS:  - Initiate breast feeding within first hour after birth. - Monitor effectiveness of current breast feeding efforts. - Assess support systems available to mother/family.  - Identify cultural beliefs/practices regarding lactation, letdown techniques, maternal food preferences.   - Assess mother's knowledge and previous experience with breast feeding.  - Provide information as needed about early infant feeding cues (e.g., rooting, lip smacking, sucking fingers/hand) versus late cue of crying.  - Discuss/demonstrate breast feeding aids (e.g., infant sling, nursing footstool/pillows, and breast pumps). - Encourage mother/other family members to express feelings/concerns, and actively listen. - Educate father/SO about benefits of breast feeding and how to manage common lactation challenges. - Recommend avoidance of specific medications or substances incompatible with breast feeding.  - Assess and monitor for signs of nipple pain/trauma. - Instruct and provide assistance with proper latch. - Review techniques for milk expression (breast pumping) and storage of breast milk. Provide pumping equipment/supplies, instructions and assistance, as needed. - Encourage rooming-in and breast feeding on demand.  - Encourage skin-to-skin contact. - Provide LC support as needed. - Assess for and manage engorgement. - Provide breast feeding education handouts and information on community breast feeding support. Outcome: Progressing  Goal: Establishment of adequate milk supply with medication/procedure interruptions  Description: INTERVENTIONS:  - Review techniques for milk expression (breast pumping). - Provide pumping equipment/supplies, instructions, and assistance until it is safe to breastfeed infant. Outcome: Progressing  Goal: Appropriate maternal -  bonding  Description: INTERVENTIONS:  - Assess caregiver- interactions. - Assess caregiver's emotional status and coping mechanisms. - Encourage caregiver to participate in  daily care. - Assess support systems available to mother/family.  - Provide /case management support as needed.   Outcome: Progressing

## 2023-01-02 NOTE — ANESTHESIA POSTPROCEDURE EVALUATION
Patient: Courtney Pickett    Procedure Summary     Date: 01/02/23 Room / Location:     Anesthesia Start: [facility] Anesthesia Stop: 6780    Procedure: LABOR ANALGESIA Diagnosis:     Scheduled Providers:  Anesthesiologist: Savanna Roberts MD    Anesthesia Type: epidural ASA Status: 2          Anesthesia Type: epidural    Vitals Value Taken Time   BP See rn vitals 01/02/23 1237   Temp  01/02/23 1237   Pulse  01/02/23 1237   Resp  01/02/23 1237   SpO2  01/02/23 1237       [facility] Post Evaluation:   Patient Evaluated in PACU  Patient Participation: complete - patient participated  Level of Consciousness: awake and alert  Pain Management: adequate  Airway Patency:patent  Dental exam unchanged from preop  Yes    Cardiovascular Status: acceptable  Respiratory Status: acceptable  Postoperative Hydration acceptable      Daron Guardado MD  1/2/2023 12:37 PM

## 2023-01-02 NOTE — PROGRESS NOTES
This RN was in the [facility] when the  notified this RN of a disturbance with a visitor regarding the visitor policy. The  stated \"A dad has been yelling at me about having his son visiting, and is very upset with the policy that does not allow children under 25years old. \" This RN notified the  of the [facility] that there is a dad who has been yelling downstairs. The dad came to the  without the child and the  asked where he is headed to. The dad yelleded \"Im going to [address]. Im here to see my baby. \" The  asked if he was a care partner or visitor. He yelled \"What's a care partner? I don't know what a care partner is. Im here to see my baby. \" The  attempted to deescalate the situation and asked if he could stop yelling. The dad started cursing at the  saying \"I don't have anything to you. You guys don't know that the fuck you're talking about\" and standing in a threatening way. Security was called. The  then opened up the door for a visitor that was leaving the unit, and the dad come onto the unit without permission. Once the dad was in the patient's room, he began yelling at the . Security arrived and escorted him off the unit.

## 2023-01-02 NOTE — PLAN OF CARE
Mom received into postpartum room in stable condition with all belongings and care partner. Assisted into bed in low locked position with side rails up and call light in reach. Admission fundal assessment completed, vitals stable, bleeding within normal limits. Report received from labor RN Yuriy Moreno. Patient and family oriented to room, plan of care discussed.

## 2023-01-02 NOTE — L&D DELIVERY NOTE
[facility]    Vaginal Delivery Note    Emmanuel Finder Patient Status:  Outpatient    3/11/1990 MRN [id number]   Location [address]  Attending Yesenia Aj MD   Hosp Day # 1 PCP Chrystal Flores MD     Delivery     Infant  Date of Delivery: 2023   Time of Delivery: 9:47 AM  Delivery Type: Normal spontaneous vaginal delivery    Infant Sex  Information for the patient's : Dom Odonnell Girl [[id number]]   female    Infant Birthweight  Information for the patient's : Juan Vazquez [[id number]]   6 lb 9.8 oz (3 kg)     Presentation Vertex [1]  Position Left [1] Occiput [1] Anterior [1]    Apgars:  1 minute: 8               5 minutes: 9                        10 minutes:      Placenta  Date/Time of Delivery: 2023  9:50 AM   Delivery: spontaneous   Placenta to Pathology: no  Cord Gases Submitted: no  Cord Blood Collection: yes  Cord Tissue Collection: yes  Cord Complications: nuchal x1 reduced and body x1 delivered through   Sponge and Needle Counts:  Verified yes      Maternal Anesthesia: epidural   Episiotomy/Laceration Repair  Laceration: none    Delivery Complications  none    Neonatologist Present: no  Delivery Comment:  over intact perineum without complications     Intake/Output   QBL:  See nursing documentation     Mariam Tellez MD  2023  9:58 AM    Juan Vazquez [[id number]]    Labor Events     labor?: No   steroids?: None  Rupture date/time: 2023 0615     Rupture type: SROM  Fluid color: Clear  Induction: None     Labor Event Times    Labor onset date/time: 2023 0615  Dilation complete date/time: 2023 6141      Presentation    Presentation: Vertex     Operative Delivery    No data filed     Shoulder Dystocia    No data filed     Anesthesia    Method: Epidural          Stark Delivery    Head delivery date/time: 2023 09:47:14   Delivery date/time:  23 09:47:28   Delivery type: Normal spontaneous vaginal delivery    Details:     Delivery location: delivery room     Delivery Providers    Delivering Clinician: Pepe Champion MD   Delivery personnel:  Provider Role    Baby Nurse   Marsha Hall RN Delivery Nurse         Cord    Vessels: 3 Vessels  Complications: Nuchal  # of loops: 1  Timed cord clamping: Yes  Time in sec: 90  Cord blood disposition: to lab  Gases sent?: No     Resuscitation    Method: None      Measurements    Weight: 3000 g 6 lb 9.8 oz          Placenta    Date/time: 2023 0950  Removal: Spontaneous  Appearance: Intact  Disposition: Discarded     Apgars    Living status: Living   Apgar Scoring Key:    0 1 2    Skin color Blue or pale Acrocyanotic Completely pink    Heart rate Absent <100 bpm >100 bpm    Reflex irritability No response Grimace Cry or active withdrawal    Muscle tone Limp Some flexion Active motion    Respiratory effort Absent Weak cry; hypoventilation Good, crying              1 Minute:  5 Minute:  10 Minute:  15 Minute:  20 Minute:    Skin color:        Heart rate:        Reflex irritablity:        Muscle tone:        Respiratory effort: Total:            disposition: with mother     Skin to Skin    Skin to skin initiated date/time: 2023 0947  Skin to skin with:  Mother     Vaginal Count    Initial count RN: Shay Giraldo RN  Initial count Tech: Rio Person   Sponges   Sharps    Initial counts 10   0    Final counts 10   0    Final count RN: Marsha Hall RN  Final count MD: Pepe Champion MD     Delivery (Maternal)    No data filed

## 2023-01-02 NOTE — PROGRESS NOTES
Pt is a 28year old female admitted to TR2/TR2-A. Patient presents with:  R/o Labor  R/o Rom: Pt came by ambulance, reported leaking of fluid since 0615, contractions every 3-4 minutes apart. Pt is [id number] 39w2d intra-uterine pregnancy. History obtained, consents signed. Oriented to room, staff, and plan of care.

## 2023-01-02 NOTE — BH PROGRESS NOTE
YG PPD SCREENING    Reason for Referral: EPDS 10    Current Stressors:    History of PPD: \"no\"   Financial: \"yes, I just have a hard managing money, I don't have enough money to manage, I have money for necessities but that's really about it. \"   Other: \"for question 6 I answered that with regard to my anxiety and rumination on things making it harder for me to get things done on a day to day basis than it normally would, I get them done but I don't get them done as quickly as I normally would. I answered question 8 with regard to my finances and how having two children will impact the limited money and resources we already have in addition to things with my boyfriend, we've been having issues with a while, it's been going throughout the pregnancy. I have baseline anxiety, I get a lot of racing thoughts, rumination, elevated heart rate, SOB, dizziness (occasionally), dehydration but I don't know if that's the anxiety or just something that's been going on my often, I do get headaches when I'm anxious sometimes and have had nausea in the past too but not recently. For question 3 I don't think I answered that correctly, I haven't blamed myself for things when they went wrong, I didn't answer that correctly (answer was 1, not very often and she reports it should be 0 never). My boyfriend doesn't want to keep our daughter, he wants to put her up for adoption because of our financial issues and I don't, if he were to be persistent on this I'd go to my mom's, but I want to see if we can work it out before I approach her about moving in with her. That and our finances have been the biggest trigger to my answers and current sx. \"    Awa Hurt has hx outpatient therapy individually over 5 years ago, hx IOP at [facility] in her first trimester, went off of seroquel 25mg 4-6 months ago weaned off at home d/t headaches and extreme fatigue she was on seroquel for a couple month prior to pregnancy.  Current pregnancy/parenthood support group through her [employer] that she speaks with daily and sees regularly, current outpatient psychiatrist through [facility] every 4 months, she started seeing this provider while in IOP during her first trimester and has continued to see the psychiatrist on an outpatient basis. Hx PHP stepdown as a teenager (13-14) at [facility]. She was at [address] [place] [address] in 2017 for SI with no plan or intent but felt she needed extra help at that time, hx inpt at age [age] at [facility] for suicide attempt. No hx alcohol, cannabis or illicit substance abuse, nicotine vape prior to pregnancy, former tobacco user. Mental Health Status:    Current Symptoms: \"I'm tired and I need to pee, I'm nervous about what will happen with my boyfriend but I'm happy with my baby, I'm hoping to both breastfeed and pump. \"   Appearance/General Behavior: stated age female laying in hospital bed holding baby   General Cognitive Functioning: intact   Thought Process: mixed linear and circumstantial   Thought Content: appropriate to situation (relationship)   Mood/Affect: calm but sad about relationship issues but happy with baby and attentive to her, affect congruent to mood   Orientation: a/o x3-4 (admittedly fatigued)   Speech: normal rate, rhythm, and soft-toned volume   Homicidal/Suicidal Ideation/Plan: hx suicide attempt at age 15-16, went straight to [facility] for inpt pt, hx SI with no plan or intent in 2017 and called for help which led her to inpt at [facility]. She has had no SI/HI/SIB since that time. CSSR = 1, INDICATING LOW RISK    Living Arrangement:    Who Resides at Home: \"I live in [place] with my son and my boyfriend. 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 \"   Has other Children: 5year old son    Is Father of the Baby involved: \"it's kind of up in the air right now, it's a complicated situation with him but I'm hoping he will be. \"    Has Familial Support: \"I have a supportive family, my mom is here right now. My boyfriend's family is passed away\"    Has Other Support Network: \"I have some social supports yeah\"    Plan:    Provide PPD Information:     Postpartum Depression Resources Given: yes    Cradle Talk Information Given: yes    Depression During and After Pregnancy Information given: yes   Provide YG Contact Information: NO - OON WITH YG   Other Information Given: [facility]  supports, MOPS, additional in network providers so she can see a therapist in addition to her current psychiatry provider through [facility]. Will add to follow up list in the event she'd like to see [facility] again prior to discharge, she will continue with her existing psychiatrist who she reports will be doing medication management with after she discharges and can base new meds on her feeding method/schedule. Zandra ROY LPC    Note to patient:  The [facility] makes medical notes like these available to patients in the interest of transparency. However, be advised this is a medical document. It is intended as peer to peer communication. It is written in medical language and may contain abbreviations or verbiage that are unfamiliar. It may appear blunt or direct. Medical documents are intended to carry relevant information, facts as evident, and the clinical opinion of the practitioner.

## 2023-01-03 LAB
BASOPHILS # BLD AUTO: 0.04 X10(3) UL (ref 0–0.2)
BASOPHILS NFR BLD AUTO: 0.4 %
DEPRECATED RDW RBC AUTO: 45.3 FL (ref 35.1–46.3)
EOSINOPHIL # BLD AUTO: 0.29 X10(3) UL (ref 0–0.7)
EOSINOPHIL NFR BLD AUTO: 2.6 %
ERYTHROCYTE [DISTWIDTH] IN BLOOD BY AUTOMATED COUNT: 13.6 % (ref 11–15)
HCT VFR BLD AUTO: 33.1 %
HGB BLD-MCNC: 10.7 G/DL
IMM GRANULOCYTES # BLD AUTO: 0.06 X10(3) UL (ref 0–1)
IMM GRANULOCYTES NFR BLD: 0.5 %
LYMPHOCYTES # BLD AUTO: 3.56 X10(3) UL (ref 1–4)
LYMPHOCYTES NFR BLD AUTO: 32.5 %
MCH RBC QN AUTO: 29.8 PG (ref 26–34)
MCHC RBC AUTO-ENTMCNC: 32.3 G/DL (ref 31–37)
MCV RBC AUTO: 92.2 FL
MONOCYTES # BLD AUTO: 0.72 X10(3) UL (ref 0.1–1)
MONOCYTES NFR BLD AUTO: 6.6 %
NEUTROPHILS # BLD AUTO: 6.3 X10 (3) UL (ref 1.5–7.7)
NEUTROPHILS # BLD AUTO: 6.3 X10(3) UL (ref 1.5–7.7)
NEUTROPHILS NFR BLD AUTO: 57.4 %
PLATELET # BLD AUTO: 300 10(3)UL (ref 150–450)
RBC # BLD AUTO: 3.59 X10(6)UL
WBC # BLD AUTO: 11 X10(3) UL (ref 4–11)

## 2023-01-03 NOTE — LACTATION NOTE
This note was copied from a baby's chart. LACTATION NOTE - INFANT    Evaluation Type  Evaluation Type: Inpatient    Problems & Assessment  Problems Diagnosed or Identified: Latch difficulty;  feeding problem;Sleepy  Problems: comment/detail: Assisted with latch per mother request, mother reports infant is sleepy  Muscle tone: Appropriate for GA    Feeding Assessment  Summary Current Feeding: Breastfeeding exclusively  Last 24 hour feeding summary: last fed 6 hous prior per mother  Breastfeeding Assessment: Assisted with breastfeeding w/mother's permission; Fussy infant, unable to sustain suckling to breast;PO supplement followed breastfeeding  Breastfeeding lasted # of minutes: 15  Breastfeeding Positions: cross cradle;right breast;left breast;cradle  Latch: Too sleepy or reluctant, no latch achieved  Audible Sucks/Swallows: None  Type of Nipple: Everted (after stimulation)  Comfort (Breast/Nipple): Soft/non-tender  Hold (Positioning): Full assist, staff holds infant at breast  LATCH Score: 4  Other (comment): Infant too fussy and unorganized at this session, introduced nipple shield without success, unable to latch, supplement recommended and demonstrated paced bottle feeding. Pre/Post Weights  Supplement total, ml: 21    Equipment used  Equipment used: Nipple Shield; Bottle with slow flow nipple  Nipple shield size: 20 mm

## 2023-01-03 NOTE — CM/SW NOTE
MDO to YAMEL for Patient states does not have supplies for  at home and father of baby requesting her to put baby up for adoption but she does not want to    SW self referral due to finances/WIC resources    SW met with patient and FOB  bedside. SW confirmed face sheet contact as correct. Baby boy/girl name:Baby girl  Date & time of delivery:23 @ 9:47am  Delivery method:Normal spontaneous vaginal delivery  Siblings age:10 yr old    Patient employed:Denied   Length of maternity leave:n/a    Father of baby employed:Yes  Length of paternity leave:Denied    Breast or formula feed:Breast feed    Pediatrician:BRADLEY  SW encouraged pt to schedule infant first appointment (usually within 48 hours of discharge) prior to pt discharge. Pt expressed understanding. Infant Insurance:Medicaid  Change HC contacted:Yes    Mental Health History: See [address] [place] [address] progress note     Medications:See [address] [place] [address] progress note       Therapist:See  progress note       Psychiatrist:See [address] [place] [address] progress note       YAMEL discussed signs, symptoms and risks associated with post partum depression & anxiety. SW provided pt with PMAD resources. Other resources provided:Pt endorses being current w/WIC services. SW provided adoption material for review. Patient support system: Pt's mother and [employer]. Patient denied current questions/needs from SW.    SW/CM to remain available for support and/or discharge planning.       ARIANA Tyson, [facility]  Social Work   FUT:#[phone]

## 2023-01-03 NOTE — LACTATION NOTE
LACTATION NOTE - MOTHER      Evaluation Type: Inpatient    Problems identified  Problems identified: Knowledge deficit  Milk supply not WNL: Reduced (potential)  Problems Identified Other: see  note, hx suicidal ideation, PTSD, blind in left eye. Maternal history  Maternal history: Anemia; Depression; Anxiety  Other/comment: exclusively BF when lc arrived. pt states infant has not fed for 6 hours and sleepy. Assisted with parent/lc led attempt, infant unorganized and fussy during attempt, unable to sustain latch with or without nipple shield (introduced at this time), supplement recommended and demonstrated. Has not been able to latch to left breast per pt. Breastfeeding goal  Breastfeeding goal: To maintain breast milk feeding per patient goal    Maternal Assessment  Bilateral Breasts: Soft;Symmetrical  Right Nipple: Elastic;Slightly everted/short  Left Nipple: Thick/dense;Slightly everted/short  Prior breastfeeding experience (comment below): Multip; First baby to breast  Prior BF experience: comment: has a 8year old child, did not BF  Breastfeeding Assistance: Breastfeeding assistance provided with permission    Pain assessment  Location/Comment: denies    Guidelines for use of:  Current use of pump[de-identified] initiated by MB RN, infrequent use of pump  Suggested use of pump: For comfort as needed;Pump if infant is not latching to breast;Pump after nursing if a nipple shield is used;Pump each time a supplement is offered  Reported pumping volumes (ml): 0  Other (comment): Reviewed continued lactation follow up

## 2023-01-04 ENCOUNTER — APPOINTMENT (OUTPATIENT)
Dept: HEMATOLOGY/ONCOLOGY | Facility: HOSPITAL | Age: 33
End: 2023-01-04
Attending: OBSTETRICS & GYNECOLOGY
Payer: MEDICAID

## 2023-01-04 VITALS
RESPIRATION RATE: 18 BRPM | OXYGEN SATURATION: 98 % | HEART RATE: 92 BPM | SYSTOLIC BLOOD PRESSURE: 109 MMHG | DIASTOLIC BLOOD PRESSURE: 62 MMHG | TEMPERATURE: 98 F

## 2023-01-04 NOTE — PLAN OF CARE
Problem: PAIN - ADULT  Goal: Verbalizes/displays adequate comfort level or patient's stated pain goal  Description: INTERVENTIONS:  - Encourage pt to monitor pain and request assistance  - Assess pain using appropriate pain scale  - Administer analgesics based on type and severity of pain and evaluate response  - Implement non-pharmacological measures as appropriate and evaluate response  - Consider cultural and social influences on pain and pain management  - Manage/alleviate anxiety  - Utilize distraction and/or relaxation techniques  - Monitor for opioid side effects  - Notify MD/LIP if interventions unsuccessful or patient reports new pain  - Anticipate increased pain with activity and pre-medicate as appropriate  Outcome: Progressing     Problem: ANXIETY  Goal: Will report anxiety at manageable levels  Description: INTERVENTIONS:  - Administer medication as ordered  - Teach and rehearse alternative coping skills  - Provide emotional support with 1:1 interaction with staff  Outcome: Progressing     Problem: Patient Centered Care  Goal: Patient preferences are identified and integrated in the patient's plan of care  Description: Interventions:  - What would you like us to know as we care for you? This is my second baby.   - Provide timely, complete, and accurate information to patient/family  - Incorporate patient and family knowledge, values, beliefs, and cultural backgrounds into the planning and delivery of care  - Encourage patient/family to participate in care and decision-making at the level they choose  - Honor patient and family perspectives and choices  Outcome: Progressing     Problem: Patient/Family Goals  Goal: Patient/Family Long Term Goal  Description: Patient's Long Term Goal: Return home with a healthy baby     Interventions:  - Monitor fundal position   -Monitor bleeding   -Pain management   - See additional Care Plan goals for specific interventions  Outcome: Progressing  Goal: Patient/Family Short Term Goal  Description: Patient's Short Term Goal: Pain control     Interventions:   -Pain medications   -Ice therapy   - See additional Care Plan goals for specific interventions  Outcome: Progressing     Problem: POSTPARTUM  Goal: Long Term Goal:Experiences normal postpartum course  Description: INTERVENTIONS:  - Assess and monitor vital signs and lab values. - Assess fundus and lochia. - Provide ice/sitz baths for perineum discomfort. - Monitor healing of incision/episiotomy/laceration, and assess for signs and symptoms of infection and hematoma. - Assess bladder function and monitor for bladder distention.  - Provide/instruct/assist with pericare as needed. - Provide VTE prophylaxis as needed. - Monitor bowel function.  - Encourage ambulation and provide assistance as needed. - Assess and monitor emotional status and provide social service/psych resources as needed. - Utilize standard precautions and use personal protective equipment as indicated. Ensure aseptic care of all intravenous lines and invasive tubes/drains.  - Obtain immunization and exposure to communicable diseases history. Outcome: Progressing  Goal: Optimize infant feeding at the breast  Description: INTERVENTIONS:  - Initiate breast feeding within first hour after birth. - Monitor effectiveness of current breast feeding efforts. - Assess support systems available to mother/family.  - Identify cultural beliefs/practices regarding lactation, letdown techniques, maternal food preferences. - Assess mother's knowledge and previous experience with breast feeding.  - Provide information as needed about early infant feeding cues (e.g., rooting, lip smacking, sucking fingers/hand) versus late cue of crying.  - Discuss/demonstrate breast feeding aids (e.g., infant sling, nursing footstool/pillows, and breast pumps). - Encourage mother/other family members to express feelings/concerns, and actively listen.   - Educate father/SO about benefits of breast feeding and how to manage common lactation challenges. - Recommend avoidance of specific medications or substances incompatible with breast feeding.  - Assess and monitor for signs of nipple pain/trauma. - Instruct and provide assistance with proper latch. - Review techniques for milk expression (breast pumping) and storage of breast milk. Provide pumping equipment/supplies, instructions and assistance, as needed. - Encourage rooming-in and breast feeding on demand.  - Encourage skin-to-skin contact. - Provide LC support as needed. - Assess for and manage engorgement. - Provide breast feeding education handouts and information on community breast feeding support. Outcome: Progressing  Goal: Establishment of adequate milk supply with medication/procedure interruptions  Description: INTERVENTIONS:  - Review techniques for milk expression (breast pumping). - Provide pumping equipment/supplies, instructions, and assistance until it is safe to breastfeed infant. Outcome: Progressing  Goal: Appropriate maternal -  bonding  Description: INTERVENTIONS:  - Assess caregiver- interactions. - Assess caregiver's emotional status and coping mechanisms. - Encourage caregiver to participate in  daily care. - Assess support systems available to mother/family.  - Provide /case management support as needed. Outcome: Progressing    Sat with patient and updated patient and family on plan of care. Pain medication discussed and answered all questions. Vitals are WNL. Breastfeeding and supplementing with bottle as needed and breastfeeding successfully. Pumping introduced d/t to intermittent formula supplementation. Lochia WNL and fundus is firm. Will call RN with any questions.

## 2023-01-04 NOTE — LACTATION NOTE
LACTATION NOTE - MOTHER      Evaluation Type: Inpatient    Problems identified  Problems identified: Knowledge deficit;Milk supply not WNL  Milk supply not WNL: Reduced (potential)         Breastfeeding goal  Breastfeeding goal: To maintain breast milk feeding per patient goal    Maternal Assessment  Breastfeeding Assistance: [facility] assistance declined at this time         Guidelines for use of: Other (comment): Patient by choice is breast and formula feedings. Discussed normal NB behavior. Educated patient about supply/demand and the importance of frequent stimulation. Encouraged to call [facility] if assistance with breastfeeding is needed. Educated about all outpatient services. Patient does not have a pump at home, has info for getting pump through insurance. Pt taught and sent home with hand pump.

## 2023-01-04 NOTE — DISCHARGE SUMMARY
[facility] - [facility]    Discharge Summary    Nelly Antoine Patient Status:  Inpatient    3/11/1990 MRN [id number]   Location [facility] 3SE Attending Deo Renae MD   Hosp Day # 2 PCP Dieudonne Bridges MD     Primary OB Clinician: [email or web address]    EDC: Estimated Date of Delivery: 23    Gestational Age: 39w2d    Antepartum complications: anemia    Date of Delivery: 23      Delivered By: Gerardine Bolshraddha. Delivery Type: spontaneous vaginal delivery    Tubal Ligation: n/a    Baby: Liveborn     Anesthesia: epidural    Intrapartum Complications: None    Laceration: none    Episiotomy: none    Placenta: spontaneous    Feeding Method: breast fed      Discharge Date: 23    Discharge Time: midday    Early Discharge:  NO    Alert and oriented nad  abd soft, nontender, fundus firm  Ext nt    Discharge counseling done. Plan:     Address and phone number verified and same. Follow-up appointment with office in 3 weeks. Juancho Rutherford MD  2023  11:52 AM

## 2023-01-04 NOTE — PLAN OF CARE
Problem: PAIN - ADULT  Goal: Verbalizes/displays adequate comfort level or patient's stated pain goal  Description: INTERVENTIONS:  - Encourage pt to monitor pain and request assistance  - Assess pain using appropriate pain scale  - Administer analgesics based on type and severity of pain and evaluate response  - Implement non-pharmacological measures as appropriate and evaluate response  - Consider cultural and social influences on pain and pain management  - Manage/alleviate anxiety  - Utilize distraction and/or relaxation techniques  - Monitor for opioid side effects  - Notify MD/LIP if interventions unsuccessful or patient reports new pain  - Anticipate increased pain with activity and pre-medicate as appropriate  Outcome: Completed     Problem: ANXIETY  Goal: Will report anxiety at manageable levels  Description: INTERVENTIONS:  - Administer medication as ordered  - Teach and rehearse alternative coping skills  - Provide emotional support with 1:1 interaction with staff  Outcome: Completed     Problem: Patient Centered Care  Goal: Patient preferences are identified and integrated in the patient's plan of care  Description: Interventions:    - Provide timely, complete, and accurate information to patient/family  - Incorporate patient and family knowledge, values, beliefs, and cultural backgrounds into the planning and delivery of care  - Encourage patient/family to participate in care and decision-making at the level they choose  - Honor patient and family perspectives and choices  Outcome: Completed     - See additional Care Plan goals for specific interventions  Outcome: Completed     Problem: POSTPARTUM  Goal: Long Term Goal:Experiences normal postpartum course  Description: INTERVENTIONS:  - Assess and monitor vital signs and lab values. - Assess fundus and lochia. - Provide ice/sitz baths for perineum discomfort.   - Monitor healing of incision/episiotomy/laceration, and assess for signs and symptoms of infection and hematoma. - Assess bladder function and monitor for bladder distention.  - Provide/instruct/assist with pericare as needed. - Provide VTE prophylaxis as needed. - Monitor bowel function.  - Encourage ambulation and provide assistance as needed. - Assess and monitor emotional status and provide social service/psych resources as needed. - Utilize standard precautions and use personal protective equipment as indicated. Ensure aseptic care of all intravenous lines and invasive tubes/drains.  - Obtain immunization and exposure to communicable diseases history. Outcome: Completed  Goal: Optimize infant feeding at the breast  Description: INTERVENTIONS:  - Initiate breast feeding within first hour after birth. - Monitor effectiveness of current breast feeding efforts. - Assess support systems available to mother/family.  - Identify cultural beliefs/practices regarding lactation, letdown techniques, maternal food preferences. - Assess mother's knowledge and previous experience with breast feeding.  - Provide information as needed about early infant feeding cues (e.g., rooting, lip smacking, sucking fingers/hand) versus late cue of crying.  - Discuss/demonstrate breast feeding aids (e.g., infant sling, nursing footstool/pillows, and breast pumps). - Encourage mother/other family members to express feelings/concerns, and actively listen. - Educate father/SO about benefits of breast feeding and how to manage common lactation challenges. - Recommend avoidance of specific medications or substances incompatible with breast feeding.  - Assess and monitor for signs of nipple pain/trauma. - Instruct and provide assistance with proper latch. - Review techniques for milk expression (breast pumping) and storage of breast milk. Provide pumping equipment/supplies, instructions and assistance, as needed. - Encourage rooming-in and breast feeding on demand.  - Encourage skin-to-skin contact.   - Provide LC support as needed. - Assess for and manage engorgement. - Provide breast feeding education handouts and information on community breast feeding support. Outcome: Completed  Goal: Establishment of adequate milk supply with medication/procedure interruptions  Description: INTERVENTIONS:  - Review techniques for milk expression (breast pumping). - Provide pumping equipment/supplies, instructions, and assistance until it is safe to breastfeed infant. Outcome: Completed  Goal: Appropriate maternal -  bonding  Description: INTERVENTIONS:  - Assess caregiver- interactions. - Assess caregiver's emotional status and coping mechanisms. - Encourage caregiver to participate in  daily care. - Assess support systems available to mother/family.  - Provide /case management support as needed.   Outcome: Completed

## 2023-01-09 ENCOUNTER — TELEPHONE (OUTPATIENT)
Dept: OBGYN CLINIC | Facility: CLINIC | Age: 33
End: 2023-01-09

## 2023-01-21 ENCOUNTER — TELEPHONE (OUTPATIENT)
Dept: OBGYN UNIT | Facility: HOSPITAL | Age: 33
End: 2023-01-21

## 2023-02-14 ENCOUNTER — POSTPARTUM (OUTPATIENT)
Dept: OBGYN CLINIC | Facility: CLINIC | Age: 33
End: 2023-02-14

## 2023-02-14 ENCOUNTER — TELEPHONE (OUTPATIENT)
Dept: OBGYN CLINIC | Facility: CLINIC | Age: 33
End: 2023-02-14

## 2023-02-14 VITALS
SYSTOLIC BLOOD PRESSURE: 96 MMHG | BODY MASS INDEX: 32.03 KG/M2 | WEIGHT: 199.31 LBS | DIASTOLIC BLOOD PRESSURE: 64 MMHG | HEIGHT: 66 IN

## 2023-02-14 DIAGNOSIS — Z30.2 REQUEST FOR STERILIZATION: Primary | ICD-10-CM

## 2023-02-14 DIAGNOSIS — Z78.9 USES DEPO-PROVERA AS PRIMARY BIRTH CONTROL METHOD: ICD-10-CM

## 2023-02-14 PROCEDURE — 96372 THER/PROPH/DIAG INJ SC/IM: CPT | Performed by: OBSTETRICS & GYNECOLOGY

## 2023-02-14 PROCEDURE — 3078F DIAST BP <80 MM HG: CPT | Performed by: OBSTETRICS & GYNECOLOGY

## 2023-02-14 PROCEDURE — 3074F SYST BP LT 130 MM HG: CPT | Performed by: OBSTETRICS & GYNECOLOGY

## 2023-02-14 PROCEDURE — 0503F POSTPARTUM CARE VISIT: CPT | Performed by: OBSTETRICS & GYNECOLOGY

## 2023-02-14 PROCEDURE — 3008F BODY MASS INDEX DOCD: CPT | Performed by: OBSTETRICS & GYNECOLOGY

## 2023-02-14 RX ORDER — MEDROXYPROGESTERONE ACETATE 150 MG/ML
150 INJECTION, SUSPENSION INTRAMUSCULAR ONCE
Status: COMPLETED | OUTPATIENT
Start: 2023-02-14 | End: 2023-02-14

## 2023-02-14 RX ADMIN — MEDROXYPROGESTERONE ACETATE 150 MG: 150 INJECTION, SUSPENSION INTRAMUSCULAR at 17:22:00

## 2023-02-14 NOTE — PROGRESS NOTES
Postpartum Care      HPI: Emmanuel Archer is a 28year old  female who presents for a postpartum visit. Delivery Date: 2023    Delivering Physician: Dr. Fermin Espinoza    Type of Delivery:     Gestational Age: 44w2d    Prenatal Complications: none    Delivery Complications: n/a    Episiotomy/Laceration: none    Bleeding: none    Incision: n/a    Rhogam: n/a    Rubella Status/Vaccine: Immune    Baby's Name: Shivani Alicea Gender: Girl    [de-identified] Birth Weight: 6lb 9.8oz    Baby's Health: good    Breast or Formula Feeding: Breast feeding supplementing formula    Contraception: Patient would like to discuss BS    Depression Screen: 3    Last Pap: 2022/schedule annual exam 3 mo    Concerns: blood pressure concerns    LMP 2022 (Exact Date)   Wt Readings from Last 3 Encounters:  23 : 199 lb 4.7 oz (90.4 kg)  12/15/22 : 219 lb (99.3 kg)  22 : 214 lb 12.8 oz (97.4 kg)    Annual Physical Never done  COVID-19 Vaccine(1) Never done  DTaP,Tdap,and Td Vaccines(1 - Tdap) Never done  Influenza Vaccine(1) Never done  Annual Depression Screening due on 2023  Pap Smear due on 2025  Pneumococcal Vaccine: Birth to 64yrs Aged Out        Review of Systems   General: Not Present- Anorexia, Fatigue, Fever, Weight Gain > 10lbs. and Weight Loss > 10lbs. Amaya Ellison HEENT: Not Present- Headache, Visual Disturbances, Vertigo and Bleeding Gums. Breast: Not Present- Breast Mass, Breast Pain, Nipple Discharge and Nipple Pain. Gastrointestinal: Not Present- Abdominal Pain, Change in Bowel Habits, Nausea and Vomiting. Female Genitourinary: Not Present- Dysuria, Flank Pain, Frequency, Hematuria, Incontinence, Pelvic Pain, Urgency, Vaginal Bleeding and Vaginal Discharge. Psychiatric: Not Present- Anxiety, Change in Sleep Pattern and Depression. Endocrine: Not Present- Libido Change. Hematology: Not Present- Anemia, Easy Bruising, Prolonged Bleeding and Spontaneous Bleeding.   All other systems negative       Physical Exam   The physical exam findings are as follows:       General   Mental Status - Alert. General Appearance - Cooperative. Not in acute distress or Sickly. Orientation - Oriented X4. Build & Nutrition - Well nourished. Hydration - Well hydrated. Chest and Lung Exam   Auscultation:   Breath sounds: - Normal.      Breast   Nipples: Discharge - Bilateral - No Colostrum - thick or Colostrum - yellow. Breast - Bilateral - Normal.      Cardiovascular   Auscultation: Rhythm - Regular. Heart Sounds - Normal heart sounds. Abdomen   Inspection: - Inspection Normal.  Palpation/Percussion: Palpation and Percussion of the abdomen reveal - Non Tender and No Palpable abdominal masses. Auscultation: Auscultation of the abdomen reveals - Bowel sounds normal.      Female Genitourinary     External Genitalia   Perineum - Normal. Bartholin's Gland - Bilateral - Normal.  Introitus: Characteristics - Normal. Discharge - None. Labia Majora: Characteristics - Bilateral - Normal.  Labia Minora: Characteristics - Bilateral - Normal.  Urethra: Characteristics - Normal. Discharge - None. [place] Gland - Bilateral - Normal.  Vulva: Characteristics - Normal. Lesions - None. Speculum & Bimanual   Vagina:   Vaginal Wall: - Normal.  Vaginal Lesions - None. Vaginal Mucosa - Normal.  Cervix: Characteristics - No Motion tenderness. Discharge - None. Uterus: Characteristics - Normal. Fundal Height - Just above symphysis pubis. Adnexa: Characteristics - Bilateral - Normal. Masses - No Adnexal Masses. Pelvic Floor Muscles - 0/5. Peripheral Vascular   Lower Extremity: Inspection - Bilateral - Inspection Normal.  Palpation: Edema - Bilateral - No edema. Neurologic   Mental Status: - Normal.    Discussed postpartum care, expectation for menses, contraception, and pregnancy timing. Discussed with patient activity level postpartum and when she can resume all normal activities.   Encourage patient to continue her prenatal vitamin until done breast-feeding. Patient desires Depo today and BS for birth control. Patient scheduled for her routine GYN exam.      1. Encounter for postpartum visit    2.  Uses Depo-Provera as primary birth control method  - medroxyPROGESTERone (Depo-Provera) 150 MG/ML injection 150 mg

## 2023-02-14 NOTE — TELEPHONE ENCOUNTER
SURGERY:  BS   DATE REQUESTED:  Per patient   Anesthesia:  gen   ASSIST NEEDED:  Yes   PRE-OP WITH PCP: no     DX:  Desires sterilization     Naz Manning MD

## 2023-02-15 NOTE — TELEPHONE ENCOUNTER
Pt agrees to surgery date/time. [facility]     -assist pending   -pt to come in on 2/17 to sign HFS consent.

## 2023-04-07 NOTE — LETTER
MINOR CASE LETTER      2/16/2023        Dear Randall Mcneill are having a Laparoscopic Bilateral Salpingectomy on Monday, 03/27/2023. You will receive a call the day prior with an arrival time. Do not eat or drink anything (including water) after midnight the night before surgery. If your procedure is scheduled later in the day, then nothing by mouth for 6 hours before arrival to the hospital.    Tiny Sandy are to call this office if you have any cold or flu symptoms 2 days before your scheduled surgery. Please avoid aspirin 7 days before surgery. Avoid Ibuprofen, Motrin, Aleve, or Naprosyn for 3 days before surgery. You will be contacted by PreAdmission Testing (PAT) usually within the week before surgery. They will take a short medical history and let you know if any preoperative testing is needed. Please make arrangements for someone to drive you home after your surgery. Please feel free to contact our office at [phone] if you have any questions regarding these instructions or your procedure.       Sincerely,          Patricia Zimmerman MD  [facility] MEDICAL [facility], [address] [place] [address]  [phone] Quality 431: Preventive Care And Screening: Unhealthy Alcohol Use - Screening: Patient screened for unhealthy alcohol use using a single question and scores less than 2 times per year Detail Level: Detailed Quality 226: Preventive Care And Screening: Tobacco Use: Screening And Cessation Intervention: Patient screened for tobacco use and is an ex/non-smoker Quality 130: Documentation Of Current Medications In The Medical Record: Current Medications Documented

## 2023-05-09 ENCOUNTER — HOSPITAL ENCOUNTER (OUTPATIENT)
Age: 33
Discharge: HOME | End: 2023-05-09
Payer: MEDICAID

## 2023-05-09 VITALS
TEMPERATURE: 98 F | SYSTOLIC BLOOD PRESSURE: 96 MMHG | OXYGEN SATURATION: 97 % | RESPIRATION RATE: 16 BRPM | DIASTOLIC BLOOD PRESSURE: 80 MMHG | HEART RATE: 89 BPM

## 2023-05-09 DIAGNOSIS — J06.9 VIRAL URI: Primary | ICD-10-CM

## 2023-05-09 LAB
S PYO AG THROAT QL: NEGATIVE
SARS-COV-2 RNA RESP QL NAA+PROBE: NOT DETECTED

## 2023-05-09 PROCEDURE — 87880 STREP A ASSAY W/OPTIC: CPT | Performed by: NURSE PRACTITIONER

## 2023-05-09 PROCEDURE — U0002 COVID-19 LAB TEST NON-CDC: HCPCS | Performed by: NURSE PRACTITIONER

## 2023-05-09 PROCEDURE — 99213 OFFICE O/P EST LOW 20 MIN: CPT | Performed by: NURSE PRACTITIONER

## 2023-05-09 NOTE — DISCHARGE INSTRUCTIONS
Please drink plenty of fluids  Steam showers  Rest!  Mucinex DM twice per day for 7 days, with plenty of fluids  Seek medical care if your symptoms worsen  See your primary care provider in 5 days if no improvement

## 2023-11-29 ENCOUNTER — OFFICE VISIT (OUTPATIENT)
Dept: FAMILY MEDICINE CLINIC | Facility: CLINIC | Age: 33
End: 2023-11-29
Payer: MEDICAID

## 2023-11-29 VITALS
BODY MASS INDEX: 31.34 KG/M2 | DIASTOLIC BLOOD PRESSURE: 78 MMHG | OXYGEN SATURATION: 96 % | WEIGHT: 195 LBS | SYSTOLIC BLOOD PRESSURE: 110 MMHG | TEMPERATURE: 100 F | RESPIRATION RATE: 18 BRPM | HEART RATE: 117 BPM | HEIGHT: 66 IN

## 2023-11-29 DIAGNOSIS — J02.0 STREP PHARYNGITIS: Primary | ICD-10-CM

## 2023-11-29 DIAGNOSIS — J02.9 SORE THROAT: ICD-10-CM

## 2023-11-29 LAB
CONTROL LINE PRESENT WITH A CLEAR BACKGROUND (YES/NO): YES YES/NO
KIT LOT #: NORMAL NUMERIC

## 2023-11-29 PROCEDURE — 99213 OFFICE O/P EST LOW 20 MIN: CPT | Performed by: NURSE PRACTITIONER

## 2023-11-29 PROCEDURE — 87880 STREP A ASSAY W/OPTIC: CPT | Performed by: NURSE PRACTITIONER

## 2023-11-29 PROCEDURE — 3078F DIAST BP <80 MM HG: CPT | Performed by: NURSE PRACTITIONER

## 2023-11-29 PROCEDURE — 3074F SYST BP LT 130 MM HG: CPT | Performed by: NURSE PRACTITIONER

## 2023-11-29 PROCEDURE — 3008F BODY MASS INDEX DOCD: CPT | Performed by: NURSE PRACTITIONER

## 2023-11-29 RX ORDER — CEPHALEXIN 500 MG/1
500 CAPSULE ORAL 2 TIMES DAILY
Qty: 20 CAPSULE | Refills: 0 | Status: SHIPPED | OUTPATIENT
Start: 2023-11-29 | End: 2023-12-09

## 2023-12-16 ENCOUNTER — HOSPITAL ENCOUNTER (OUTPATIENT)
Age: 33
Discharge: ANOTHER HOSPITAL | End: 2023-12-16
Payer: MEDICAID

## 2023-12-16 VITALS
DIASTOLIC BLOOD PRESSURE: 63 MMHG | RESPIRATION RATE: 18 BRPM | OXYGEN SATURATION: 99 % | HEART RATE: 84 BPM | TEMPERATURE: 98 F | SYSTOLIC BLOOD PRESSURE: 97 MMHG

## 2023-12-16 DIAGNOSIS — R30.0 DYSURIA: ICD-10-CM

## 2023-12-16 DIAGNOSIS — R10.9 LEFT FLANK PAIN: Primary | ICD-10-CM

## 2023-12-16 DIAGNOSIS — R11.2 NAUSEA AND VOMITING IN ADULT: ICD-10-CM

## 2023-12-16 LAB
B-HCG UR QL: NEGATIVE
GLUCOSE UR STRIP-MCNC: NEGATIVE MG/DL
HGB UR QL STRIP: NEGATIVE
KETONES UR STRIP-MCNC: NEGATIVE MG/DL
LEUKOCYTE ESTERASE UR QL STRIP: NEGATIVE
NITRITE UR QL STRIP: NEGATIVE
PH UR STRIP: 5.5 [PH]
PROT UR STRIP-MCNC: 30 MG/DL
SP GR UR STRIP: 1.02
UROBILINOGEN UR STRIP-ACNC: <2 MG/DL

## 2023-12-16 RX ORDER — ONDANSETRON 4 MG/1
4 TABLET, ORALLY DISINTEGRATING ORAL ONCE
Status: COMPLETED | OUTPATIENT
Start: 2023-12-16 | End: 2023-12-16

## 2023-12-27 ENCOUNTER — HOSPITAL ENCOUNTER (OUTPATIENT)
Age: 33
Discharge: HOME | End: 2023-12-27
Payer: MEDICAID

## 2023-12-27 ENCOUNTER — APPOINTMENT (OUTPATIENT)
Dept: GENERAL RADIOLOGY | Age: 33
End: 2023-12-27
Attending: NURSE PRACTITIONER
Payer: MEDICAID

## 2023-12-27 VITALS
HEART RATE: 105 BPM | RESPIRATION RATE: 20 BRPM | DIASTOLIC BLOOD PRESSURE: 66 MMHG | TEMPERATURE: 99 F | OXYGEN SATURATION: 97 % | SYSTOLIC BLOOD PRESSURE: 124 MMHG

## 2023-12-27 DIAGNOSIS — J11.1 INFLUENZA: Primary | ICD-10-CM

## 2023-12-27 LAB
POCT INFLUENZA A: POSITIVE
POCT INFLUENZA B: NEGATIVE
SARS-COV-2 RNA RESP QL NAA+PROBE: NOT DETECTED

## 2023-12-27 PROCEDURE — 87502 INFLUENZA DNA AMP PROBE: CPT | Performed by: NURSE PRACTITIONER

## 2023-12-27 PROCEDURE — U0002 COVID-19 LAB TEST NON-CDC: HCPCS | Performed by: NURSE PRACTITIONER

## 2023-12-27 PROCEDURE — 99213 OFFICE O/P EST LOW 20 MIN: CPT | Performed by: NURSE PRACTITIONER

## 2023-12-27 PROCEDURE — 71046 X-RAY EXAM CHEST 2 VIEWS: CPT | Performed by: NURSE PRACTITIONER

## 2023-12-27 RX ORDER — ONDANSETRON 4 MG/1
4 TABLET, ORALLY DISINTEGRATING ORAL EVERY 4 HOURS PRN
Qty: 10 TABLET | Refills: 0 | Status: SHIPPED | OUTPATIENT
Start: 2023-12-27 | End: 2024-01-03

## 2023-12-27 RX ORDER — ONDANSETRON 4 MG/1
4 TABLET, ORALLY DISINTEGRATING ORAL ONCE
Status: COMPLETED | OUTPATIENT
Start: 2023-12-27 | End: 2023-12-27

## 2023-12-27 RX ORDER — BENZONATATE 100 MG/1
100 CAPSULE ORAL 3 TIMES DAILY PRN
Qty: 30 CAPSULE | Refills: 0 | Status: SHIPPED | OUTPATIENT
Start: 2023-12-27 | End: 2024-01-26

## 2023-12-27 NOTE — ED INITIAL ASSESSMENT (HPI)
Pt with cough and chest pain x 2 days. + subjective fever/chills. States son had flu and patient believes she had the flu the past week. She denies having fevers last week.

## 2023-12-28 NOTE — DISCHARGE INSTRUCTIONS
Please increase fluids and make sure you are staying hydrated. You may take Tylenol or ibuprofen as needed for fever. Close follow-up with your primary care provider as recommended.

## 2024-10-07 ENCOUNTER — HOSPITAL ENCOUNTER (OUTPATIENT)
Age: 34
Discharge: HOME | End: 2024-10-07
Payer: MEDICAID

## 2024-10-07 ENCOUNTER — APPOINTMENT (OUTPATIENT)
Dept: CT IMAGING | Facility: HOSPITAL | Age: 34
End: 2024-10-07
Attending: Physician Assistant
Payer: MEDICAID

## 2024-10-07 ENCOUNTER — APPOINTMENT (OUTPATIENT)
Dept: ULTRASOUND IMAGING | Age: 34
End: 2024-10-07
Attending: Physician Assistant
Payer: MEDICAID

## 2024-10-07 VITALS
RESPIRATION RATE: 16 BRPM | OXYGEN SATURATION: 97 % | TEMPERATURE: 98 F | SYSTOLIC BLOOD PRESSURE: 127 MMHG | DIASTOLIC BLOOD PRESSURE: 47 MMHG | HEART RATE: 91 BPM

## 2024-10-07 DIAGNOSIS — N93.9 ABNORMAL UTERINE BLEEDING: Primary | ICD-10-CM

## 2024-10-07 LAB
#MXD IC: 0.5 X10ˆ3/UL (ref 0.1–1)
B-HCG UR QL: NEGATIVE
BUN BLD-MCNC: 6 MG/DL (ref 7–18)
CHLORIDE BLD-SCNC: 106 MMOL/L (ref 98–112)
CLARITY UR: CLEAR
CO2 BLD-SCNC: 20 MMOL/L (ref 21–32)
CREAT BLD-MCNC: 0.8 MG/DL
EGFRCR SERPLBLD CKD-EPI 2021: 99 ML/MIN/1.73M2 (ref 60–?)
GLUCOSE BLD-MCNC: 95 MG/DL (ref 70–99)
GLUCOSE UR STRIP-MCNC: NEGATIVE MG/DL
HCT VFR BLD AUTO: 46.3 %
HCT VFR BLD CALC: 51 %
HGB BLD-MCNC: 15 G/DL
ISTAT IONIZED CALCIUM FOR CHEM 8: 1.13 MMOL/L (ref 1.12–1.32)
LEUKOCYTE ESTERASE UR QL STRIP: NEGATIVE
LYMPHOCYTES # BLD AUTO: 3.1 X10ˆ3/UL (ref 1–4)
LYMPHOCYTES NFR BLD AUTO: 38.6 %
MCH RBC QN AUTO: 30.7 PG (ref 26–34)
MCHC RBC AUTO-ENTMCNC: 32.4 G/DL (ref 31–37)
MCV RBC AUTO: 94.7 FL (ref 80–100)
MIXED CELL %: 6 %
NEUTROPHILS # BLD AUTO: 4.5 X10ˆ3/UL (ref 1.5–7.7)
NEUTROPHILS NFR BLD AUTO: 55.4 %
NITRITE UR QL STRIP: NEGATIVE
PH UR STRIP: 5.5 [PH]
PLATELET # BLD AUTO: 382 X10ˆ3/UL (ref 150–450)
POTASSIUM BLD-SCNC: 4 MMOL/L (ref 3.6–5.1)
PROT UR STRIP-MCNC: 30 MG/DL
RBC # BLD AUTO: 4.89 X10ˆ6/UL
SODIUM BLD-SCNC: 138 MMOL/L (ref 136–145)
SP GR UR STRIP: 1.02
UROBILINOGEN UR STRIP-ACNC: <2 MG/DL
WBC # BLD AUTO: 8.1 X10ˆ3/UL (ref 4–11)

## 2024-10-07 PROCEDURE — 80047 BASIC METABLC PNL IONIZED CA: CPT | Performed by: PHYSICIAN ASSISTANT

## 2024-10-07 PROCEDURE — 85025 COMPLETE CBC W/AUTO DIFF WBC: CPT | Performed by: PHYSICIAN ASSISTANT

## 2024-10-07 PROCEDURE — 81002 URINALYSIS NONAUTO W/O SCOPE: CPT | Performed by: PHYSICIAN ASSISTANT

## 2024-10-07 PROCEDURE — 93975 VASCULAR STUDY: CPT | Performed by: PHYSICIAN ASSISTANT

## 2024-10-07 PROCEDURE — 76856 US EXAM PELVIC COMPLETE: CPT | Performed by: PHYSICIAN ASSISTANT

## 2024-10-07 PROCEDURE — 81025 URINE PREGNANCY TEST: CPT | Performed by: PHYSICIAN ASSISTANT

## 2024-10-07 PROCEDURE — 99214 OFFICE O/P EST MOD 30 MIN: CPT | Performed by: PHYSICIAN ASSISTANT

## 2024-10-07 PROCEDURE — 74177 CT ABD & PELVIS W/CONTRAST: CPT | Performed by: PHYSICIAN ASSISTANT

## 2024-10-07 PROCEDURE — 76830 TRANSVAGINAL US NON-OB: CPT | Performed by: PHYSICIAN ASSISTANT

## 2024-10-07 RX ORDER — NORGESTIMATE AND ETHINYL ESTRADIOL 0.25-0.035
1 KIT ORAL DAILY
Qty: 28 TABLET | Refills: 0 | Status: SHIPPED | OUTPATIENT
Start: 2024-10-07 | End: 2024-11-04

## 2024-10-07 NOTE — DISCHARGE INSTRUCTIONS
Take oral contraceptive pill as directed for vaginal bleeding   You may benefit from taking a daily prenatal vitamin to ensure you are getting adequate vitamins  Drink plenty of water   Follow up with ob/gyn     If you experience severe/worsening pain, increased vaginal bleeding (soaking 1 pad or tampon every hour for 3 hours), persistent vomiting, or any other concerning symptoms, go directly to nearest ER immediately

## 2024-10-07 NOTE — ED PROVIDER NOTES
Chief Complaint   Patient presents with    Eval-G       History obtained from: patient   services not used     HPI:     Tammi De La Garza is a 34 year old female who presents with vaginal bleeding x 3 days. Patient states vaginal bleeding became acutely heavier with passing clots yesterday. Patient also notes passing white clumps with blood. Patient endorses cramping across lower abdomen and sharp paint to left lower abdomen. Patient also notes feeling weak and 2-3 episodes of vomiting yesterday. Patient took an iron supplement yesterday. Patient endorses a history of anemia during pregnancy but states she normally has regular periods. Denies history of heavy bleeding or blood transfusions. Patient states she is infrequently sexually active with her partner and denies concern for STI. Denies syncope, chest pain, shortness of breath, urinary symptoms, fevers, chills, flank pain.      PMH  Past Medical History:    Anemia complicating pregnancy in second trimester (HCC)    Anxiety    Blindness of left eye    Chlamydia    Chlamydia infection    Congenital birth defect    casued optic damage    Decorative tattoo    Depression    History of suicidal ideation    hospitalized [place]    History of suicidal ideation    hospitalized [place]    Peptic ulcer    Sexually transmitted disease       PFSH    PFSH asessment screens reviewed and agree.  Nurses notes reviewed I agree with documentation.    Family History   Problem Relation Age of Onset    Ovarian Cancer Mother      Family history reviewed with patient/caregiver and is not pertinent to presenting problem.  Social History     Socioeconomic History    Marital status: Single     Spouse name: Not on file    Number of children: Not on file    Years of education: Not on file    Highest education level: Not on file   Occupational History    Not on file   Tobacco Use    Smoking status: Former    Smokeless tobacco: Never   Vaping Use    Vaping status: Never Used    Substance and Sexual Activity    Alcohol use: Not Currently    Drug use: Not Currently    Sexual activity: Not on file   Other Topics Concern    Not on file   Social History Narrative    Not on file     Social Determinants of Health     Financial Resource Strain: Not on File (2023)    Received from [facility]    Financial Resource Strain     Financial Resource Strain: 0   Food Insecurity: Not on File (2024)    Received from [facility]    Food Insecurity     Food: 0   Transportation Needs: Not on File (2023)    Received from [facility]    Transportation Needs     Transportation: 0   Physical Activity: Not on File (2023)    Received from [facility]    Physical Activity     Physical Activity: 0   Stress: Not on File (2023)    Received from [facility]    Stress     Stress: 0   Social Connections: Not on File (9/15/2024)    Received from [facility]    Social Connections     Connectedness: 0   Housing Stability: Not on File (2023)    Received from [facility]    Housing Stability     Housin         ROS:   Positive for stated complaint: vaginal bleeding, pelvic pain   All other systems reviewed and negative except as noted above.  Constitutional and Vital Signs Reviewed.    Physical Exam:     Findings:    /47   Pulse 91   Temp 97.9 °F (36.6 °C) (Oral)   Resp 16   LMP 10/04/2024 (Exact Date)   SpO2 97%   GENERAL: well developed, no acute distress, non-toxic appearing   SKIN: good skin turgor, no obvious rashes  HEAD: normocephalic, atraumatic  EYES: sclera non-icteric bilaterally, conjunctiva clear bilaterally  OROPHARYNX: MMM, pharynx clear, maintaining airway and secretions  NECK: no nuchal rigidity, no trismus, no edema, phonation normal    CARDIO: RRR, normal heart sounds   LUNGS: clear to auscultation bilaterally, no increased WOB  GI: normoactive bowel sounds, abdomen soft, generalized lower abdominal tenderness, no CVA tenderness bilaterally   : JONE Jeffers chaperone  present, no genital lesions or rash, no inguinal lymphadenopathy bilaterally, moderate dark red blood present in vaginal canal, no obvious discharge, no CMT, left adnexal tenderness   EXTREMITIES: no cyanosis or edema, KNOX without difficulty  NEURO: no focal deficits  PSYCH: alert and oriented x3, answering questions appropriately, mood appropriate    MDM/Assessment/Plan:   Orders for this encounter:    Orders Placed This Encounter    US PELVIS EV W DOPPLER (CPT=76856/46816/16292)     Order Specific Question:   What is the Relevant Clinical Indication / Reason for Exam?     Answer:   heavy vaginal bleeding with clots, left pelvic pain     Order Specific Question:   Release to patient     Answer:   Immediate    CT ABDOMEN+PELVIS(CONTRAST ONLY)(CPT=74177)     Order Specific Question:   If clinically indicated, CT Protocol includes Oral Contrast. Can Oral Contrast be administered?     Answer:   Yes     Order Specific Question:   What is the Relevant Clinical Indication / Reason for Exam?     Answer:   LLQ pain, pelvic pain, vaginal bleeding, normal US     Order Specific Question:   Release to patient     Answer:   Immediate    POCT CBC     Order Specific Question:   Release to patient     Answer:   Immediate    POCT Urinalysis Dipstick    POCT Pregnancy, Urine    POCT ISTAT chem8 cartridge    Vaginitis Vaginosis PCR Panel     Order Specific Question:   Release to patient     Answer:   Immediate    POCT Urine Dip    POCT Pregnancy, Urine    iStat (Chem 8)    Insert Peripheral IV    iopamidol 76% (ISOVUE-370) injection for power injector    Norgestimate-Eth Estradiol (SPRINTEC 28) 0.25-35 MG-MCG Oral Tab     Sig: Take 1 tablet by mouth daily for 28 days.     Dispense:  28 tablet     Refill:  0       Labs performed this visit:  Recent Results (from the past 10 hour(s))   POCT Urinalysis Dipstick    Collection Time: 10/07/24 11:41 AM   Result Value Ref Range    Urine Color Orange (A) Yellow    Urine Clarity Clear Clear     Specific Gravity, Urine 1.025 1.005 - 1.030    PH, Urine 5.5 5.0 - 8.0    Protein urine 30 (A) Negative mg/dL    Glucose, Urine Negative Negative mg/dL    Ketone, Urine Trace (A) Negative mg/dL    Bilirubin, Urine Moderate (A) Negative    Blood, Urine Large (A) Negative    Nitrite Urine Negative Negative    Urobilinogen urine <2.0 <2.0 mg/dL    Leukocyte esterase urine Negative Negative   POCT Pregnancy, Urine    Collection Time: 10/07/24 11:54 AM   Result Value Ref Range    POCT Urine Pregnancy Negative Negative   POCT CBC    Collection Time: 10/07/24 12:19 PM   Result Value Ref Range    WBC IC 8.1 4.0 - 11.0 x10ˆ3/uL    RBC IC 4.89 3.80 - 5.30 X10ˆ6/uL    HGB IC 15.0 12.0 - 16.0 g/dL    HCT IC 46.3 35.0 - 48.0 %    MCV IC 94.7 80.0 - 100.0 fL    MCH IC 30.7 26.0 - 34.0 pg    MCHC IC 32.4 31.0 - 37.0 g/dL    PLT .0 150.0 - 450.0 X10ˆ3/uL    # Neutrophil 4.5 1.5 - 7.7 X10ˆ3/uL    # Lymphocyte 3.1 1.0 - 4.0 X10ˆ3/uL    # Mixed Cells 0.5 0.1 - 1.0 X10ˆ3/uL    Neutrophil % 55.4 %    Lymphocyte % 38.6 %    Mixed Cell % 6.0 %   POCT ISTAT chem8 cartridge    Collection Time: 10/07/24 12:26 PM   Result Value Ref Range    ISTAT Sodium 138 136 - 145 mmol/L    ISTAT BUN 6 (L) 7 - 18 mg/dL    ISTAT Potassium 4.0 3.6 - 5.1 mmol/L    ISTAT Chloride 106 98 - 112 mmol/L    ISTAT Ionized Calcium 1.13 1.12 - 1.32 mmol/L    ISTAT Hematocrit 51 (H) 34 - 50 %    ISTAT Glucose 95 70 - 99 mg/dL    ISTAT TCO2 20 (L) 21 - 32 mmol/L    ISTAT Creatinine 0.80 0.55 - 1.02 mg/dL    eGFR-Cr 99 >=60 mL/min/1.73m2       Imaging performed this visit:  CT ABDOMEN+PELVIS(CONTRAST ONLY)(CPT=74177)   Final Result   PROCEDURE: CT ABDOMEN + PELVIS (CONTRAST ONLY) (CPT=74177)       COMPARISON: [facility], US PELVIS EV W DOPPLER    (CPT=76856/32850/88167), 10/07/2024, 12:23 PM.  [facility], CT ABDOMEN + PELVIS KIDNEYSTONE 2D RNDR (NO IV NO ORAL)    (CPT=741, 1/05/2022, 3:17 AM.       INDICATIONS: LLQ pain, pelvic  pain, vaginal bleeding, normal US.       TECHNIQUE: CT images of the abdomen and pelvis were obtained with    non-ionic intravenous contrast material.  Automated exposure control for    dose reduction was used. Adjustment of the mA and/or kV was done based on    the patient's size. Use of iterative    reconstruction technique for dose reduction was used.  Dose information is    transmitted to the ACR (American College of Radiology) NRDR (National    Radiology Data Registry) which includes the Dose Index Registry.       FINDINGS:    LUNG BASES: No focal consolidation.   LIVER: No enlargement, atrophy, abnormal density, or significant focal    lesion.     SPLEEN: No enlargement or focal lesion.     GALLBLADDER: No cholelithiasis.   PANCREAS: No lesion, fluid collection, ductal dilatation, or atrophy.     ADRENALS: No defined mass or abnormal enlargement.     KIDNEYS: Enhance symmetrically without hydronephrosis.  Punctate    nonobstructing lower pole left renal calculus.  See   AORTA/VASCULAR:   Abdominal aorta is normal in caliber.   ABDOMINAL NODES: No mass or adenopathy.     BOWEL/MESENTERY:  No dilated loops of bowel or definite abnormal bowel    wall thickening.  The appendix is unremarkable.     ABDOMINAL WALL: Tiny fat containing umbilical hernia.   URINARY BLADDER: No focal wall thickening or calculus.     PELVIC NODES: No adenopathy.      PELVIC ORGANS: No visible mass.  Pelvic organs appropriate for patient    age.     BONES:   Spondylosis lower thoracic and lumbar spine.   OTHER: Negative.                     =====   CONCLUSION:        No evidence of acute abnormality in the abdomen or pelvis.       Punctate nonobstructing left renal calculus.               Dictated by (CST): Jaylon Quezada MD on 10/07/2024 at 3:28 PM        Finalized by (CST): aJylon Quezada MD on 10/07/2024 at 3:32 PM               US PELVIS EV W DOPPLER (CPT=76856/30928/39100)   Final Result   PROCEDURE: US PELVIS EV W DOPPLER  (PON=75204/17463/78404)       COMPARISON: None.       INDICATIONS: Heavy vaginal bleeding with clots w/ left pelvic pain       TECHNIQUE: Pelvic ultrasound using transabdominal and transvaginal    technique. Duplex/color Doppler evaluation of the ovaries for torsion.        FINDINGS:    UTERUS:   Size is 7.8 x 3.8 x 4.1 cm.  The uterine volume 65 mL.        ENDOMETRIUM: Endometrial thickness is 4 mm.  No fluid or mass in the    endometrial canal.  There is a 2 mm endometrial calcification.   MYOMETRIUM: Normal echogenicity.  No masses.         OVARIES AND ADNEXA:        RIGHT:   The right ovary measures 2.6 x 1.5 x 1.5 cm.  Normal appearance    with no masses.     LEFT:   The left ovary measures 2.4 x 1.7 x 1.8 cm.  Normal appearance    with no masses.         DOPPLER:   Normal spectral analysis, arterial and venous waveforms and    color flow in both ovaries.     CUL-DE-SAC:   Normal.  No free fluid or mass.     OTHER:   Negative.  Bladder appears normal.                     =====   CONCLUSION:         1. No ovarian torsion.  Unremarkable appearance of the ovaries.   2. Normal endometrial thickness of 4 mm.  A 2 mm endometrial calcification    is present.   3. No free fluid in the pelvis.   4. Lesser incidental findings described above.               Dictated by (CST): Alber Thornton MD on 10/07/2024 at 2:09 PM        Finalized by (CST): Alber Thornton MD on 10/07/2024 at 2:11 PM                   Medical Decision Making  DDx includes abnormal uterine bleeding versus dysmenorrhea versus ovarian cyst versus anemia versus infection versus other.  Patient is overall well-appearing with stable vitals.  Urine pregnancy test negative.  Discussed with patient possible etiologies of symptoms and recommend further evaluation including basic labs and pelvic ultrasound.  Patient agreeable to this plan.  Patient declines pain medicine.    CBC reviewed, grossly unremarkable without evidence of infection or anemia, hemoglobin  stable at 15.  BMP reviewed, grossly unremarkable without evidence of significant electrolyte derangements or kidney dysfunction.  Urine dipstick analysis notable for large blood, no signs of infection.  Vaginitis/vaginosis swab pending. US results reviewed, no evidence of ovarian torsion, unremarkable ovaries, normal endometrium with small calcification present, no free fluid in the pelvis. Discussed results with patient. Given that patient continues to have left pelvic pain, recommend CT abdomen/pelvis to r/o other possible causes of pain. Patient agreeable to this plan. Patient is hemodynamically stable. Patient driven by boyiend to [facility] for [facility].     CT results reviewed, no evidence of acute abnormality in the abdomen or pelvis, punctate non-obstructing left renal calculus noted. Discussed results over the phone with patient. Discussed case with Dr. De Jesus (patient's ob/gyn) who recommends starting patient on Sprintec oral OCP and prompt follow up in the office. Discussed these recommendations with patient who is in agreement with this plan. Discussed supportive care. Instructed patient to go directly to nearest ER with any worsening or concerning symptoms. Follow up with ob/gyn.     Discussed case with supervising attending Dr. Ramirez who is in agreement with assessment and plan.     Amount and/or Complexity of Data Reviewed  Labs: ordered.  Radiology: ordered and independent interpretation performed.    Risk  OTC drugs.  Prescription drug management.          Diagnosis:    ICD-10-CM    1. Abnormal uterine bleeding  N93.9           All results reviewed and discussed with patient/patient's family. Patient/patient's family verbalize excellent understanding of instructions and feels comfortable with plan. All of patient's/patient's family's questions were addressed.   See AVS for detailed discharge instructions for your condition today.    Follow Up with:  Lauren De Jesus  [address]  [facility] [address]  [facility] [address]  [phone]    Schedule an appointment as soon as possible for a visit   Ob/gyn      Note: This document was dictated using Dragon medical dictation software.  Proofreading was performed to the best of my ability, but errors may be present.    Alecia Gold PA-C

## 2024-10-07 NOTE — ED INITIAL ASSESSMENT (HPI)
Pt states she is on her period and passing large blood clots and large white \"stuff\" that began last night. Pt states she feels weakness. + cramping.

## 2024-10-08 ENCOUNTER — TELEPHONE (OUTPATIENT)
Dept: OBGYN CLINIC | Facility: CLINIC | Age: 34
End: 2024-10-08

## 2024-10-08 ENCOUNTER — PATIENT MESSAGE (OUTPATIENT)
Dept: OBGYN CLINIC | Facility: CLINIC | Age: 34
End: 2024-10-08

## 2024-10-08 LAB
BV BACTERIA DNA VAG QL NAA+PROBE: NEGATIVE
C GLABRATA DNA VAG QL NAA+PROBE: NEGATIVE
C KRUSEI DNA VAG QL NAA+PROBE: NEGATIVE
CANDIDA DNA VAG QL NAA+PROBE: NEGATIVE
T VAGINALIS DNA VAG QL NAA+PROBE: NEGATIVE

## 2024-10-08 NOTE — TELEPHONE ENCOUNTER
Patient was seen at [facility] 10/7. She was prescribed  Estarylla that she started last night. Her emotions are out of whack; they are different every 5 minutes. Is that related to medication and/or normal. Patient has appointment today 10/8 at 7 pm. Hoping to keep appointment as she is legally blind and is seeking transportation. Please call.

## 2024-10-08 NOTE — TELEPHONE ENCOUNTER
Pt states she has been having fluctuating emotions since taking first dose of new hormonal medication last night. Pt denies thoughts of harming self of others. Pt feels safe in her home at this time. Pt has follow-up appointment today with Dr. De Jesus. Pt advised to wait to take additional dose of medication until discussing with Dr. De Jesus at appointment today. Pt to call back if symptoms worsen or any new concerns.

## 2024-10-09 ENCOUNTER — LAB ENCOUNTER (OUTPATIENT)
Dept: LAB | Age: 34
End: 2024-10-09
Attending: STUDENT IN AN ORGANIZED HEALTH CARE EDUCATION/TRAINING PROGRAM
Payer: MEDICAID

## 2024-10-09 ENCOUNTER — OFFICE VISIT (OUTPATIENT)
Dept: OBGYN CLINIC | Facility: CLINIC | Age: 34
End: 2024-10-09

## 2024-10-09 ENCOUNTER — HOSPITAL ENCOUNTER (OUTPATIENT)
Age: 34
Discharge: ANOTHER HOSPITAL | End: 2024-10-09
Payer: MEDICAID

## 2024-10-09 VITALS
OXYGEN SATURATION: 98 % | SYSTOLIC BLOOD PRESSURE: 136 MMHG | HEART RATE: 117 BPM | RESPIRATION RATE: 26 BRPM | DIASTOLIC BLOOD PRESSURE: 57 MMHG

## 2024-10-09 VITALS
WEIGHT: 227 LBS | SYSTOLIC BLOOD PRESSURE: 93 MMHG | DIASTOLIC BLOOD PRESSURE: 67 MMHG | BODY MASS INDEX: 37 KG/M2 | HEART RATE: 63 BPM

## 2024-10-09 DIAGNOSIS — N92.0 MENORRHAGIA WITH REGULAR CYCLE: Primary | ICD-10-CM

## 2024-10-09 DIAGNOSIS — N92.0 MENORRHAGIA WITH REGULAR CYCLE: ICD-10-CM

## 2024-10-09 DIAGNOSIS — L68.0 HIRSUTISM: ICD-10-CM

## 2024-10-09 DIAGNOSIS — S09.90XA INJURY OF HEAD, INITIAL ENCOUNTER: Primary | ICD-10-CM

## 2024-10-09 DIAGNOSIS — Z30.09 ENCOUNTER FOR GENERAL COUNSELING AND ADVICE ON CONTRACEPTIVE MANAGEMENT: ICD-10-CM

## 2024-10-09 LAB
PROLACTIN SERPL-MCNC: 6.5 NG/ML
TESTOST SERPL-MCNC: 25.6 NG/DL
TSI SER-ACNC: 1.39 MIU/ML (ref 0.55–4.78)

## 2024-10-09 PROCEDURE — 36415 COLL VENOUS BLD VENIPUNCTURE: CPT

## 2024-10-09 PROCEDURE — 84146 ASSAY OF PROLACTIN: CPT

## 2024-10-09 PROCEDURE — 99214 OFFICE O/P EST MOD 30 MIN: CPT | Performed by: STUDENT IN AN ORGANIZED HEALTH CARE EDUCATION/TRAINING PROGRAM

## 2024-10-09 PROCEDURE — 99215 OFFICE O/P EST HI 40 MIN: CPT | Performed by: PHYSICIAN ASSISTANT

## 2024-10-09 PROCEDURE — 84403 ASSAY OF TOTAL TESTOSTERONE: CPT

## 2024-10-09 PROCEDURE — 84443 ASSAY THYROID STIM HORMONE: CPT
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
## 2024-10-09 NOTE — PROGRESS NOTES
[facility]  Obstetrics and Gynecology  Gyne Problem Visit      Tammi De La Garza is a 34 year old female  is a new patient to me presenting with concerns of heavy periods. She was seen in the ER on 10/7 due to passing large clots, nausea, weakness, and pelvic pain. Ultrasound done at that time showed normal sized uterus, normal endometrial thickness, and normal ovaries. H&H was stable. She was given COCs which she stopped due to side effects. She has tried progestin only pills and depo injections in the past. Patient's last menstrual period was 10/04/2024 (exact date). Still lightly bleeding today. Cycles are regular, lasting about 4 days with heavy to light flow. Noting increased facial hair that she needs to shave. Noting hair loss and increased fatigue. Not sexually active. No abnormal vaginal discharge, odor, irritation, or itching.     Pap:   Contraception:None    OBSTETRICS HISTORY:  OB History    Para Term  AB Living   7 2 2 0 5 2   SAB IAB Ectopic Multiple Live Births   4 0 1 0 2       GYNE HISTORY:      No data recorded      Latest Ref Rng & Units 2022     3:23 PM   RECENT PAP RESULTS   INTERPRETATION/RESULT: Negative for intraepithelial lesion or malignancy Negative for intraepithelial lesion or malignancy    HPV Negative Negative          History   Sexual Activity    Sexual activity: Not on file       MEDICAL HISTORY:  Past Medical History:   Diagnosis Date    Anemia complicating pregnancy in second trimester (HCC) 2022    Anxiety     Blindness of left eye 2017    Chlamydia     Chlamydia infection 2009    Congenital birth defect     casued optic damage    Decorative tattoo     Depression     History of suicidal ideation 2017    hospitalized [facility]    History of suicidal ideation 2017    hospitalized [place]    Peptic ulcer     Sexually transmitted disease      Past Surgical History:   Procedure Laterality Date    D & c      Egd         SOCIAL HISTORY:  Social  History     Socioeconomic History    Marital status: Single     Spouse name: Not on file    Number of children: Not on file    Years of education: Not on file    Highest education level: Not on file   Occupational History    Not on file   Tobacco Use    Smoking status: Former    Smokeless tobacco: Never   Vaping Use    Vaping status: Never Used   Substance and Sexual Activity    Alcohol use: Not Currently    Drug use: Not Currently    Sexual activity: Not on file   Other Topics Concern    Not on file   Social History Narrative    Not on file     Social Drivers of Health     Financial Resource Strain: Not on File (2023)    Received from [facility]    Financial Resource Strain     Financial Resource Strain: 0   Food Insecurity: Not on File (2024)    Received from [facility]    Food Insecurity     Food: 0   Transportation Needs: Not on File (2023)    Received from [facility]    Transportation Needs     Transportation: 0   Physical Activity: Not on File (2023)    Received from [facility]    Physical Activity     Physical Activity: 0   Stress: Not on File (2023)    Received from [facility]    Stress     Stress: 0   Social Connections: Not on File (9/15/2024)    Received from [facility]    Social Connections     Connectedness: 0   Housing Stability: Not on File (2023)    Received from [facility]    Housing Stability     Housin       MEDICATIONS:    Current Outpatient Medications:     Norgestimate-Eth Estradiol (SPRINTEC 28) 0.25-35 MG-MCG Oral Tab, Take 1 tablet by mouth daily for 28 days. (Patient not taking: Reported on 10/9/2024), Disp: 28 tablet, Rfl: 0    ALLERGIES:  Allergies[1]      REVIEW OF SYSTEMS:  Review of Systems   Constitutional:  Negative for chills, fever and unexpected weight change.   Respiratory: Negative.     Cardiovascular: Negative.    Gastrointestinal:  Negative for abdominal pain, constipation, diarrhea and nausea.   Genitourinary:  Positive for menstrual  problem. Negative for dyspareunia, dysuria, genital sores, hematuria, pelvic pain, vaginal bleeding, vaginal discharge and vaginal pain.   Musculoskeletal: Negative.    Skin: Negative.    Neurological: Negative.    Hematological: Negative.    Psychiatric/Behavioral: Negative.         PHYSICAL EXAM:  BP 93/67 (BP Location: Right arm, Patient Position: Sitting, Cuff Size: adult)   Pulse 63   Wt 227 lb (103 kg)   LMP 10/04/2024 (Exact Date)   BMI 36.64 kg/m²     GENERAL: well developed, well nourished, in no apparent distress  ABDOMEN: Soft, non distended; non tender, no masses  GYNE/: External Genitalia: Normal appearing, no lesions. Urethral meatus appear wnl, no abnormal discharge or lesions noted.          Bladder: well supported, urethra wnl, no lesions or fissures                     Vagina: normal pink mucosa, no lesions, moderate dark red menstrual blood.                      Uterus: mobile, non tender, normal size                     Cervix: Normal                      Adnexa: non tender, no masses, normal size     ASSESSMENT:       ICD-10-CM    1. Menorrhagia with regular cycle  N92.0 TSH W Reflex To Free T4      2. Hirsutism  L68.0 Testosterone Total     TSH W Reflex To Free T4     Prolactin      3. Encounter for general counseling and advice on contraceptive management  Z30.09           Plan:  - Patient requesting hormonal blood work for increased facial hair growth.   - Discussed different birth control options including the vaginal ring, injection, implant, and IUDs. Patient is considering Nexplanon device. She wants to wait for lab results prior to making decision.    MELANIE FELIX PA-C  11:29 AM  10/9/2024        Spent total time 30 minutes on obtaining history / chart review, evaluating patient / performing medically appropriate exam, discussing treatment options, counseling / educating, and completing documentation, coordinating care.         [1]   Allergies  Allergen Reactions     Penicillins HIVES
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
## 2024-11-18 ENCOUNTER — TELEPHONE (OUTPATIENT)
Facility: CLINIC | Age: 34
End: 2024-11-18

## 2024-11-18 NOTE — TELEPHONE ENCOUNTER
Patient has consult appt this upcoming Wednesday. Records and referral recevied via fax. Records in GI bin at the .

## 2024-11-19 NOTE — H&P
[facility] - Gastroenterology                                                                                                               Reason for consult: eval    Requesting physician or provider: Elizabet Lambert MD    Chief Complaint   Patient presents with    Abdominal Pain     Dark tarry stools, rectal bleeding       HPI:   Tammi De La Garza is a 34 year old year-old female with history of anemia, pud, anxiety, depression:    she is here today for evaluation brbpr    Eval at [facility] 11/16/24- change in bm, tarry stools, abd pain, rectal pain    Dx w/ hemorrhoid     Prescribed with anusol - was not covered and using prep h w/ improvement in sx    H/o hpylori 8 y ago - treated and reports neg eradication testing    she has both constipation and diarrhea. Was using imodium prn. She says has not helped.  May have 4-6 bms in a day.  Has both loose stool and pebble like stools in 1 day.  Has straining and incomplete evacuation.  Has brbpr at times.  Has had dark stools in the past. No pepto or oral iron supplement  No sob, dizziness. Has chronic fatigue.       Cbc (11/18/24) - not anemic  CMP - normal renal, liver function  She says has stool testing pending    Ct a/p 10/7/24    Impression   CONCLUSION:     No evidence of acute abnormality in the abdomen or pelvis.     Punctate nonobstructing left renal calculus.             Occasional heartburn improved with famotidine. she denies dysphagia, odynophagia and/or globus. She has chronic low abd cramping, upper abd stabbing pain.  Sx worse after eating.  She has nausea all the time. Has vomiting at times. Emesis clear, liquid.  she says appetite is terrible.  Weight up and down.     Has had painful period and saw gyne - w/U unremarkable per pt.     NSAIDS: as needed  Tobacco: vape  Alcohol: occasional  Marijuana: no  Illicit drugs: no    No FH GI malignancy, IBD,  celiac    No history of adverse reaction to sedation  No ROS  No anticoagulants  No pacemaker/defibrillator  No pain medications and/or sleep aides      Last colonoscopy: no  Last EGD: 2018- hp pos gastritis, reflux esophagitis.     Wt Readings from Last 6 Encounters:   11/20/24 236 lb (107 kg)   10/09/24 227 lb (103 kg)   11/29/23 195 lb (88.5 kg)   02/14/23 199 lb 4.7 oz (90.4 kg)   12/15/22 219 lb (99.3 kg)   12/05/22 214 lb 12.8 oz (97.4 kg)        History, Medications, Allergies, ROS:      Past Medical History:    Anemia complicating pregnancy in second trimester (HCC)    Anxiety    Blindness of left eye    Chlamydia    Chlamydia infection    Congenital birth defect    casued optic damage    Decorative tattoo    Depression    History of suicidal ideation    hospitalized [facility] [place]    History of suicidal ideation    hospitalized [facility] [place]    Peptic ulcer    Sexually transmitted disease      Past Surgical History:   Procedure Laterality Date    D & c      Egd        Family Hx:   Family History   Problem Relation Age of Onset    Cancer Mother     Ovarian Cancer Mother       Social History:   Social History     Socioeconomic History    Marital status: Single   Tobacco Use    Smoking status: Former    Smokeless tobacco: Never   Vaping Use    Vaping status: Never Used   Substance and Sexual Activity    Alcohol use: Not Currently    Drug use: Not Currently     Social Drivers of Health     Financial Resource Strain: Not on File (12/20/2023)    Received from [facility]    Financial Resource Strain     Financial Resource Strain: 0   Food Insecurity: Not on File (9/26/2024)    Received from [employer]    Food Insecurity     Food: 0   Transportation Needs: Not on File (12/20/2023)    Received from [facility]    Transportation Needs     Transportation: 0   Physical Activity: Not on File (12/20/2023)    Received from [facility]    Physical Activity     Physical Activity: 0   Stress: Not on File (12/20/2023)    Received from  [facility]    Stress     Stress: 0   Social Connections: Not on File (9/15/2024)    Received from [facility]    Social [employer]     Connectedness: 0   Housing Stability: Not on File (2023)    Received from [facility]    Housing Stability     Housin        Medications (Active prior to today's visit):  Current Outpatient Medications   Medication Sig Dispense Refill    albuterol 108 (90 Base) MCG/ACT Inhalation Aero Soln Inhale 2 puffs into the lungs every 4 (four) hours as needed.      Phenyleph-Shark Fanny O-Glyc-Pet (PREPARATION H RE) Place rectally.      Norgestimate-Eth Estradiol (SPRINTEC 28) 0.25-35 MG-MCG Oral Tab Take 1 tablet by mouth daily for 28 days. (Patient not taking: Reported on 10/9/2024) 28 tablet 0       Allergies:  Allergies[1]    ROS:   CONSTITUTIONAL: negative for fevers, chills, sweats and weight loss  EYES Negative for red eyes, yellow eyes, changes in vision  HEENT: Negative for dysphagia and hoarseness  RESPIRATORY: Negative for cough and shortness of breath  CARDIOVASCULAR: Negative for chest pain, palpitations  GASTROINTESTINAL: See HPI  GENITOURINARY: Negative for dysuria and frequency  MUSCULOSKELETAL: Negative for arthralgias and myalgias  NEUROLOGICAL: Negative for dizziness and headaches  BEHAVIOR/PSYCH: Negative for anxiety and poor appetite    PHYSICAL EXAM:   Blood pressure 113/65, pulse 85, height 5' 6\" (1.676 m), weight 236 lb (107 kg), last menstrual period 10/04/2024, currently breastfeeding.    GEN: WD/WN, NAD  HEENT: Supple symmetrical, trachea midline  CV: RRR, the extremities are warm and well perfused   LUNGS: No increased work of breathing  ABDOMEN: No scars, normal bowel sounds, soft, non-tender, non-distended no rebound or guarding, no masses, no hepatomegaly  MSK: No redness, no warmth, no swelling of joints  SKIN: No jaundice, no erythema, no rashes  HEMATOLOGIC: No bleeding, no bruising  NEURO: Alert and interactive, normal gait    Labs/Imaging/Procedures:      EGD 2018      PREOPERATIVE DIAGNOSIS: Abdominal pain.    POSTPROCEDURE DIAGNOSES:  1. LA class A esophagitis.  2. Gastritis.    MEDICATIONS: MAC.    REFERRING PHYSICIAN: Eilzabet Lambert MD.    PROCEDURE IN DETAIL: Obtaining informed consent, the patient was placed  in the left lateral decubitus position. Standard upper endoscope was  advanced to the second portion of the duodenum without any difficulty.  Upper third, middle third, and lower third of the esophagus were normal.  The lower third did have the least LA class A esophagitis if not B with  erosive changes at the GE junction and streaky erythema proximally  consistent with reflux esophagitis. There were no strictures and no  masses. No clearcut Hobbs's. The stomach was then entered, it  distended appropriately. Retroflexion revealed normal cardia, fundus,  long view of the gastric body and normal angularis. Antrum and prepyloric  regions had moderate erythematous changes consistent with gastritis at  least endoscopically. Biopsies were eventually taken of the antrum,  angularis, and proximal stomach. The pylorus was widely patent and easily  traversed into the bulb which was normal. The second portion of duodenum  examined and that was normal. I did random biopsies given her symptoms,  and then the scope was withdrawn back to the stomach, and biopsies were  taken of the antrum, angularis and proximal stomach as described, and then  I biopsied the esophagitis at the GE junction with cold forceps. The  stomach was decompressed. The scope was withdrawn. The patient tolerated  the procedure well.    RECOMMENDATIONS: Await biopsy results. The patient would benefit from  proton pump inhibitor therapy, but would await biopsy results. I think  this will improve her symptoms considerably as endoscopically it appears  that she has acid peptic issues.  Electronically signed by Darío Alexander MD at 05/03/2018 12:42 PM CDT   Final Diagnosis   A.  Duodenum, biopsy:  - Duodenal mucosa without significant histopathology.    B. Stomach, biopsy:  - H. pylori gastritis.  - Properly controlled Giemsa stain positive for H. pylori-like microorganisms.    C. Esophagus, biopsy:  - Reflux esophagitis.  - Chronic active esophagogastritis.         .  ASSESSMENT/PLAN:   Tammi De La Garza is a 34 year old year-old female with history of anemia, pud, anxiety, depression:    #Hpylori  #gerd  #nausea  #abd pain  #constipation  #diarrhea  #brbpr  #dark stools  Multiple gi complaints.  EGD 2018 remarkable for hpylori gastritis, gerd w/ esophagitis. S/p treatment and neg eradication testing per pt.  Has had recent labs, ct, pelvis ultrasound w/o concerning finding. Stool testing ordered by pcp but not yet done. Was dx w/ hemorrhoids and treated w/ prep h.  No fhx gi malignancy, IBD, celiac.      Recommend:  Hpylori testing  Reflux diet modification  Pantoprazole trial  Fiber trial  Ultrasound  Er if condition decline    1. Schedule colonoscopy/egd with MAC w/ General pool MD [Diagnosis:#Hpylori  #gerd  #nausea  #abd pain  #constipation  #diarrhea  #brbpr  #dark stools ]    2.  bowel prep from pharmacy ([employer])    3.   For cardiology patients and patients on blood thinners:  Please contact your cardiology clinic for clearance to proceed with the endoscopic procedure. If you are on blood thinners, please also confirm with your cardiologic clinic that you are able to hold the blood thinner per our recommendations.\"    BLOOD THINNER ORDERS:  -Hold for 48 hours (Xarelto, Eliquis, Pradaxa, Savaysa)  -Hold for 3 days (Pletal)  -Hold for 5 days (Coumadin, Plavix, Brilinta, Aggrenox)  -Hold for 7 days (Effient)     For endocrinology insulin patients:    Please contact your endocrinology clinic for insulin adjustment orders prior to your endoscopic procedure.    4. Read all bowel prep instructions carefully    5. AVOID seeds, nuts, popcorn, raw fruits and vegetables (cooked  is okay) for 2-3 days before procedure    6.   If you start any NEW medication after your visit today, please notify us. Certain medications will need to be held before the procedure, or the procedure cannot be performed.     >>>Please note: if you were prescribed Suprep for the bowel prep and it is too expensive or not covered by insurance, it is okay to substitute Trilyte (or any similar generic prep). This can be done by notifying the pharmacy or calling our office.          Orders This Visit:  No orders of the defined types were placed in this encounter.      Meds This Visit:  Requested Prescriptions      No prescriptions requested or ordered in this encounter       Imaging & Referrals:  None    ENDOSCOPIC RISK BENEFIT DISCUSSION: I described the procedure in great detail with the patient. I discussed the risks and benefits, including but not limited to: bleeding, perforation, infection, anesthesia complications, and even death. Patient will be NPO after midnight and will have a person physically present at time of pick-up to drive patient home. Patient verbalized understanding and agrees to proceed with procedure as planned.    Cornelia Luque, APRN   11/19/2024        This note was partially prepared using [employer] voice recognition dictation software. As a result, errors may occur. When identified, these errors have been corrected. While every attempt is made to correct errors during dictation, discrepancies may still exist.          [1]   Allergies  Allergen Reactions    Penicillins HIVES

## 2024-11-19 NOTE — TELEPHONE ENCOUNTER
Jerome Locke has clinic appt with you on 11/20/24. Records placed on your desk at [facility] for review.   Thanks,  Annemarie

## 2024-11-20 ENCOUNTER — TELEPHONE (OUTPATIENT)
Dept: GASTROENTEROLOGY | Facility: CLINIC | Age: 34
End: 2024-11-20

## 2024-11-20 ENCOUNTER — OFFICE VISIT (OUTPATIENT)
Dept: GASTROENTEROLOGY | Facility: CLINIC | Age: 34
End: 2024-11-20

## 2024-11-20 ENCOUNTER — LAB ENCOUNTER (OUTPATIENT)
Dept: LAB | Age: 34
End: 2024-11-20
Attending: NURSE PRACTITIONER
Payer: MEDICAID

## 2024-11-20 VITALS
DIASTOLIC BLOOD PRESSURE: 65 MMHG | HEIGHT: 66 IN | WEIGHT: 236 LBS | HEART RATE: 85 BPM | BODY MASS INDEX: 37.93 KG/M2 | SYSTOLIC BLOOD PRESSURE: 113 MMHG

## 2024-11-20 DIAGNOSIS — R19.7 DIARRHEA, UNSPECIFIED TYPE: ICD-10-CM

## 2024-11-20 DIAGNOSIS — K59.00 CONSTIPATION, UNSPECIFIED CONSTIPATION TYPE: ICD-10-CM

## 2024-11-20 DIAGNOSIS — R19.5 DARK STOOLS: ICD-10-CM

## 2024-11-20 DIAGNOSIS — R11.0 NAUSEA: ICD-10-CM

## 2024-11-20 DIAGNOSIS — K21.00 GASTROESOPHAGEAL REFLUX DISEASE WITH ESOPHAGITIS WITHOUT HEMORRHAGE: ICD-10-CM

## 2024-11-20 DIAGNOSIS — A04.8 HELICOBACTER PYLORI (H. PYLORI) INFECTION: ICD-10-CM

## 2024-11-20 DIAGNOSIS — R19.7 DIARRHEA, UNSPECIFIED TYPE: Primary | ICD-10-CM

## 2024-11-20 DIAGNOSIS — K62.5 BRBPR (BRIGHT RED BLOOD PER RECTUM): ICD-10-CM

## 2024-11-20 DIAGNOSIS — R10.9 ABDOMINAL PAIN, UNSPECIFIED ABDOMINAL LOCATION: ICD-10-CM

## 2024-11-20 DIAGNOSIS — A04.8 HELICOBACTER PYLORI (H. PYLORI) INFECTION: Primary | ICD-10-CM

## 2024-11-20 PROCEDURE — 99214 OFFICE O/P EST MOD 30 MIN: CPT | Performed by: NURSE PRACTITIONER

## 2024-11-20 PROCEDURE — 83013 H PYLORI (C-13) BREATH: CPT

## 2024-11-20 RX ORDER — PANTOPRAZOLE SODIUM 40 MG/1
40 TABLET, DELAYED RELEASE ORAL DAILY
Qty: 30 TABLET | Refills: 3 | Status: SHIPPED | OUTPATIENT
Start: 2024-11-20 | End: 2024-12-20

## 2024-11-20 RX ORDER — ALBUTEROL SULFATE 90 UG/1
2 INHALANT RESPIRATORY (INHALATION) EVERY 4 HOURS PRN
COMMUNITY
Start: 2024-03-05

## 2024-11-20 RX ORDER — POLYETHYLENE GLYCOL 3350, SODIUM CHLORIDE, SODIUM BICARBONATE, POTASSIUM CHLORIDE 420; 11.2; 5.72; 1.48 G/4L; G/4L; G/4L; G/4L
POWDER, FOR SOLUTION ORAL
Qty: 4000 ML | Refills: 0 | Status: SHIPPED | OUTPATIENT
Start: 2024-11-20

## 2024-11-20 NOTE — TELEPHONE ENCOUNTER
Schedulers, see providers orders below:     -Patient was seen in office today and was provided with written and verbal procedure prep instructions, including any medication adjustments.   -Patient was advised to call medical insurance for any questions on benefits and/or any out of pocket costs.   -Patient is aware that the GI schedulers will be calling to schedule procedure(s) and that providers may not have any availability until possibly end of the year.         Instructions 11/20/24 Cornelia    1. Schedule colonoscopy/egd with MAC w/ General pool MD [Diagnosis:#Hpylori  #gerd  #nausea  #abd pain  #constipation  #diarrhea  #brbpr  #dark stools ]     2.  bowel prep from pharmacy ([employer])     3.   For cardiology patients and patients on blood thinners:  Please contact your cardiology clinic for clearance to proceed with the endoscopic procedure. If you are on blood thinners, please also confirm with your cardiologic clinic that you are able to hold the blood thinner per our recommendations.\"     BLOOD THINNER ORDERS:  -Hold for 48 hours (Xarelto, Eliquis, Pradaxa, Savaysa)  -Hold for 3 days (Pletal)  -Hold for 5 days (Coumadin, Plavix, Brilinta, Aggrenox)  -Hold for 7 days (Effient)      For endocrinology insulin patients:     Please contact your endocrinology clinic for insulin adjustment orders prior to your endoscopic procedure.     4. Read all bowel prep instructions carefully     5. AVOID seeds, nuts, popcorn, raw fruits and vegetables (cooked is okay) for 2-3 days before procedure     6.   If you start any NEW medication after your visit today, please notify us. Certain medications will need to be held before the procedure, or the procedure cannot be performed.

## 2024-11-20 NOTE — PATIENT INSTRUCTIONS
Recommend:  Hpylori testing  Reflux diet modification  Pantoprazole trial  Fiber trial  Ultrasound  Er if condition decline    1. Schedule colonoscopy/egd with MAC w/ General pool MD [Diagnosis:#Hpylori  #gerd  #nausea  #abd pain  #constipation  #diarrhea  #brbpr  #dark stools ]    2.  bowel prep from pharmacy (split trilyte)    3.   For cardiology patients and patients on blood thinners:  Please contact your cardiology clinic for clearance to proceed with the endoscopic procedure. If you are on blood thinners, please also confirm with your cardiologic clinic that you are able to hold the blood thinner per our recommendations.\"    BLOOD THINNER ORDERS:  -Hold for 48 hours (Xarelto, Eliquis, Pradaxa, Savaysa)  -Hold for 3 days (Pletal)  -Hold for 5 days (Coumadin, Plavix, Brilinta, Aggrenox)  -Hold for 7 days (Effient)     For endocrinology insulin patients:    Please contact your endocrinology clinic for insulin adjustment orders prior to your endoscopic procedure.    4. Read all bowel prep instructions carefully    5. AVOID seeds, nuts, popcorn, raw fruits and vegetables (cooked is okay) for 2-3 days before procedure    6.   If you start any NEW medication after your visit today, please notify us. Certain medications will need to be held before the procedure, or the procedure cannot be performed.     >>>Please note: if you were prescribed Suprep for the bowel prep and it is too expensive or not covered by insurance, it is okay to substitute Trilyte (or any similar generic prep). This can be done by notifying the pharmacy or calling our office.          Tips to Control Acid Reflux    To control acid reflux, you’ll need to make some basic diet and lifestyle changes. The simple steps outlined below may be all you’ll need to ease discomfort.   Watch what you eat  Don't have fatty foods or spicy foods.  Eat fewer acidic foods, such as citrus and tomato-based foods. These can increase symptoms.  Limit drinking  alcohol, caffeine, and fizzy beverages. All increase acid reflux.  Try limiting chocolate, peppermint, and spearmint. These can make acid reflux worse in some people.     Watch when you eat  Don't lie down for 3 hours after eating.  Don't snack before going to bed.     Raise your head  Raising your head and upper body by 4 to 6 inches helps limit reflux when you’re lying down. Put blocks under the head of your bed frame or a wedge under your mattress to raise it.   Other changes  Lose weight, if you need to  Don’t exercise near bedtime  Don't wear tight-fitting clothes  Limit aspirin and ibuprofen  Stop smoking     [employer] last reviewed this educational content on 6/1/2019  © 9023-9367 [employer] [address] [place] [address], [place] [address]. All rights reserved. This information is not intended as a substitute for professional medical care. Always follow your healthcare professional's instructions.

## 2024-11-21 ENCOUNTER — TELEPHONE (OUTPATIENT)
Dept: GASTROENTEROLOGY | Facility: CLINIC | Age: 34
End: 2024-11-21

## 2024-11-21 DIAGNOSIS — A04.8 HELICOBACTER PYLORI (H. PYLORI) INFECTION: Primary | ICD-10-CM

## 2024-11-21 LAB — H PYLORI BREATH TEST: POSITIVE

## 2024-11-21 RX ORDER — METRONIDAZOLE 250 MG/1
250 TABLET ORAL 4 TIMES DAILY
Qty: 56 TABLET | Refills: 0 | Status: SHIPPED | OUTPATIENT
Start: 2024-11-21 | End: 2024-12-05

## 2024-11-21 RX ORDER — BISMUTH SUBSALICYLATE 262 MG/1
2 TABLET, CHEWABLE ORAL 4 TIMES DAILY
Qty: 112 TABLET | Refills: 0 | Status: SHIPPED | OUTPATIENT
Start: 2024-11-21 | End: 2024-12-05

## 2024-11-21 RX ORDER — TETRACYCLINE HYDROCHLORIDE 500 MG/1
500 CAPSULE ORAL 4 TIMES DAILY
Qty: 56 CAPSULE | Refills: 0 | Status: SHIPPED | OUTPATIENT
Start: 2024-11-21 | End: 2024-12-05

## 2024-11-21 NOTE — TELEPHONE ENCOUNTER
Hpylori positive        Component  Ref Range & Units 1 d ago   H pylori Breath Test  Negative Positive Abnormal           Pcn allergy    H.pylori gastric infection identified on a test. The natural history of H.pylori was reviewed with the patient, as well as possible complications from H.pylori including stomach cancer, gastritis, dyspepsia, anemia and ulcers. We discussed treatment options and rationale for treatment, as well as testing for eradication. The patient understands the risks/benefits/side-effects of treatment and wants to proceed. The patient understands only 80-95% of patients have eradication to initial course of abx, and therefore may need another course of abx if fails first treatment. There may be side-effects during treatment and patient should call if any problems.      Plan:  Will start quadruple x 14 days.   I have instructed patient to check repeat H.pylori test in 8-10 weeks to ensure eradication (order placed).     Pt was prescribed pantoprazole, but hasn't filled prescription. We discussed taking a ppi with treatment.  If she is not able to fill pantoprazole, plan to use prilosec otc twice daily x 14 days    She verbalizes understanding and is in agreement with the plan.

## 2024-11-21 NOTE — TELEPHONE ENCOUNTER
Scheduled for:  Colonoscopy 25216    Provider Name:   Khan    Date:  11/26/2024    Location:  [facility]      Sedation:  MAC    Time:  8:20 am (Patient made aware EM will call the day before with procedure/arrival time)    Prep:  Triylte    Meds/Allergies Reconciled?:  Physician reviewed     Diagnosis with codes:    Helicobacter pylori (H. pylori) infection [A04.8]   Gastroesophageal reflux disease with esophagitis without hemorrhage [K21.00]  Nausea [R11.0]    Abdominal pain, unspecified abdominal location [R10.9]  Constipation, unspecified constipation type [K59.00]   Diarrhea, unspecified type [R19.7  BRBPR (bright red blood per rectum) [K62.5]  Dark stools [R19.5]      Was patient informed to call insurance with codes (Y/N):  Yes, I confirmed [facility] insurance with the patient.     Referral sent?:  N/A    EM or [facility] notified?:  I sent an electronic request to Endo Scheduling and received a confirmation today.      Medication Orders:  This patient verbally confirmed that she is not taking:   Iron, blood thinners, BP meds, and is not diabetic   No latex allergy, PCN allergy and does not have a pacemaker     Misc Orders:       Further instructions given by staff:   I discussed the prep instructions with the patient which she verbally understood and is aware that I will send the instructions today via [employer].    Advised patient:    You will not be able to drive, operate machinery or make critical decisions the day of your procedure. Please make arrangements for transportation. You must have a  (age 18 or older) to accompany you, stay in the facility for the duration of your procedure and drive you home after the procedure.  You cannot use public transportation (Uber, Lyft, Taxi). The procedure involves sedation, and you will not be allowed to leave unaccompanied. Your procedure will not proceed forward if you're unable to confirm your  planned to escort you home.    Advised Patient:    [facility]  requires payment of copay and any patient responsibility at the time of registration.   If you have any questions regarding your potential responsibility, please contact [facility] Insurance Department at [phone] option 1.    You may receive 4 bills related to your medical procedure:   [facility] (the facility)  The procedural physician  The anesthesiologist  The pathology lab (if applicable)

## 2024-11-24 ENCOUNTER — TELEPHONE (OUTPATIENT)
Facility: CLINIC | Age: 34
End: 2024-11-24

## 2024-11-24 NOTE — TELEPHONE ENCOUNTER
OFFICE/CLINIC ON-CALL:  Patient called concerned about hard black stool on antibiotics for hpylori. Explained to her that bismuth can cause dark/black stool. That is a normal side-effect. She is coming in on Tuesday for EGD/CLn with me, okay to proceed.    HALI Luque

## 2024-11-26 ENCOUNTER — HOSPITAL ENCOUNTER (OUTPATIENT)
Facility: HOSPITAL | Age: 34
Setting detail: HOSPITAL OUTPATIENT SURGERY
Discharge: HOME | End: 2024-11-26
Attending: INTERNAL MEDICINE | Admitting: INTERNAL MEDICINE
Payer: MEDICAID

## 2024-11-26 ENCOUNTER — ANESTHESIA EVENT (OUTPATIENT)
Dept: ENDOSCOPY | Facility: HOSPITAL | Age: 34
End: 2024-11-26
Payer: MEDICAID

## 2024-11-26 ENCOUNTER — ANESTHESIA (OUTPATIENT)
Dept: ENDOSCOPY | Facility: HOSPITAL | Age: 34
End: 2024-11-26
Payer: MEDICAID

## 2024-11-26 DIAGNOSIS — A04.8 HELICOBACTER PYLORI (H. PYLORI) INFECTION: ICD-10-CM

## 2024-11-26 DIAGNOSIS — K62.5 BRBPR (BRIGHT RED BLOOD PER RECTUM): ICD-10-CM

## 2024-11-26 DIAGNOSIS — K59.00 CONSTIPATION, UNSPECIFIED CONSTIPATION TYPE: ICD-10-CM

## 2024-11-26 DIAGNOSIS — R10.9 ABDOMINAL PAIN, UNSPECIFIED ABDOMINAL LOCATION: ICD-10-CM

## 2024-11-26 DIAGNOSIS — R19.5 DARK STOOLS: ICD-10-CM

## 2024-11-26 DIAGNOSIS — R11.0 NAUSEA: ICD-10-CM

## 2024-11-26 DIAGNOSIS — K21.00 GASTROESOPHAGEAL REFLUX DISEASE WITH ESOPHAGITIS WITHOUT HEMORRHAGE: ICD-10-CM

## 2024-11-26 DIAGNOSIS — R19.7 DIARRHEA, UNSPECIFIED TYPE: ICD-10-CM

## 2024-11-26 PROBLEM — K44.9 HIATAL HERNIA: Status: ACTIVE | Noted: 2024-11-26

## 2024-11-26 PROBLEM — K63.5 POLYP OF DESCENDING COLON: Status: ACTIVE | Noted: 2024-11-26

## 2024-11-26 PROBLEM — K64.8 INTERNAL HEMORRHOIDS: Status: ACTIVE | Noted: 2024-11-26

## 2024-11-26 LAB — B-HCG UR QL: NEGATIVE

## 2024-11-26 PROCEDURE — 0DBM8ZX EXCISION OF DESCENDING COLON, VIA NATURAL OR ARTIFICIAL OPENING ENDOSCOPIC, DIAGNOSTIC: ICD-10-PCS | Performed by: INTERNAL MEDICINE

## 2024-11-26 PROCEDURE — 0DB78ZX EXCISION OF STOMACH, PYLORUS, VIA NATURAL OR ARTIFICIAL OPENING ENDOSCOPIC, DIAGNOSTIC: ICD-10-PCS | Performed by: INTERNAL MEDICINE

## 2024-11-26 PROCEDURE — 45385 COLONOSCOPY W/LESION REMOVAL: CPT | Performed by: INTERNAL MEDICINE

## 2024-11-26 PROCEDURE — 43239 EGD BIOPSY SINGLE/MULTIPLE: CPT | Performed by: INTERNAL MEDICINE

## 2024-11-26 PROCEDURE — 0DBE8ZX EXCISION OF LARGE INTESTINE, VIA NATURAL OR ARTIFICIAL OPENING ENDOSCOPIC, DIAGNOSTIC: ICD-10-PCS | Performed by: INTERNAL MEDICINE

## 2024-11-26 PROCEDURE — 45380 COLONOSCOPY AND BIOPSY: CPT | Performed by: INTERNAL MEDICINE

## 2024-11-26 RX ORDER — SODIUM CHLORIDE, SODIUM LACTATE, POTASSIUM CHLORIDE, CALCIUM CHLORIDE 600; 310; 30; 20 MG/100ML; MG/100ML; MG/100ML; MG/100ML
INJECTION, SOLUTION INTRAVENOUS CONTINUOUS
Status: DISCONTINUED | OUTPATIENT
Start: 2024-11-26 | End: 2024-11-26

## 2024-11-26 RX ORDER — GLYCOPYRROLATE 0.2 MG/ML
INJECTION, SOLUTION INTRAMUSCULAR; INTRAVENOUS AS NEEDED
Status: DISCONTINUED | OUTPATIENT
Start: 2024-11-26 | End: 2024-11-26 | Stop reason: SURG

## 2024-11-26 RX ORDER — LIDOCAINE HYDROCHLORIDE 10 MG/ML
INJECTION, SOLUTION EPIDURAL; INFILTRATION; INTRACAUDAL; PERINEURAL AS NEEDED
Status: DISCONTINUED | OUTPATIENT
Start: 2024-11-26 | End: 2024-11-26 | Stop reason: SURG

## 2024-11-26 RX ORDER — NALOXONE HYDROCHLORIDE 0.4 MG/ML
0.08 INJECTION, SOLUTION INTRAMUSCULAR; INTRAVENOUS; SUBCUTANEOUS ONCE AS NEEDED
Status: DISCONTINUED | OUTPATIENT
Start: 2024-11-26 | End: 2024-11-26

## 2024-11-26 RX ADMIN — GLYCOPYRROLATE 0.2 MG: 0.2 INJECTION, SOLUTION INTRAMUSCULAR; INTRAVENOUS at 08:12:00

## 2024-11-26 RX ADMIN — SODIUM CHLORIDE, SODIUM LACTATE, POTASSIUM CHLORIDE, CALCIUM CHLORIDE: 600; 310; 30; 20 INJECTION, SOLUTION INTRAVENOUS at 08:35:00

## 2024-11-26 RX ADMIN — LIDOCAINE HYDROCHLORIDE 100 MG: 10 INJECTION, SOLUTION EPIDURAL; INFILTRATION; INTRACAUDAL; PERINEURAL at 08:12:00

## 2024-11-26 NOTE — H&P
History & Physical Examination    Patient Name: Tammi De La Garza  MRN: [id number]  CSN: [id number]  YOB: 1990    Diagnosis: change in bowels, nausea, ab pain    Prescriptions Prior to Admission[1]  Current Facility-Administered Medications   Medication Dose Route Frequency    lactated ringers infusion   Intravenous Continuous       Allergies: Allergies[2]    Past Medical History:    Anemia complicating pregnancy in second trimester (HCC)    Anxiety    Blindness of left eye    Calculus of kidney    Chlamydia    Chlamydia infection    Congenital birth defect    casued optic damage    Decorative tattoo    Depression    History of adverse reaction to anesthesia    becomes \"aggitated\" when sedation initiated    History of suicidal ideation    hospitalized [facility]    History of suicidal ideation    hospitalized [facility]    Peptic ulcer    H. Pylori    Sexually transmitted disease     Past Surgical History:   Procedure Laterality Date    Adenoidectomy      Create eardrum opening,gen anesth      D & c      Egd       Family History   Problem Relation Age of Onset    Cancer Mother     Ovarian Cancer Mother      Social History     Tobacco Use    Smoking status: Former    Smokeless tobacco: Never   Substance Use Topics    Alcohol use: Not Currently         SYSTEM Check if Review is Normal Check if Physical Exam is Normal If not normal, please explain:   HEENT [X ] [ X]    NECK  [X ] [ X]    HEART [X ] [ X]    LUNGS [X ] [ X]    ABDOMEN [X ] [ X]    EXTREMITIES [X ] [ X]    OTHER        I have discussed the risks and benefits and alternatives of the procedure with the patient/family.  They understand and agree to proceed with plan of care.   I have reviewed the History and Physical done within the last 30 days.  Any changes noted above.    WINSTON Khan MD  [facility] - Gastroenterology  11/26/2024  8:02 AM               [1]   Medications Prior to Admission   Medication Sig Dispense Refill Last  Dose/Taking    albuterol 108 (90 Base) MCG/ACT Inhalation Aero Soln Inhale 2 puffs into the lungs every 4 (four) hours as needed.   Taking As Needed    Phenyleph-Shark Fanny O-Glyc-Pet (PREPARATION H RE) Place rectally.   Taking    pantoprazole 40 MG Oral Tab EC Take 1 tablet (40 mg total) by mouth daily. 30 tablet 3 11/25/2024    bismuth subsalicylate 262 MG Oral Chew Tab Chew 2 tablets (524 mg total) by mouth 4 (four) times daily for 14 days. 112 tablet 0     metRONIDAZOLE 250 MG Oral Tab Take 1 tablet (250 mg total) by mouth 4 (four) times daily for 14 days. 56 tablet 0 11/25/2024    Tetracycline HCl 500 MG Oral Cap Take 1 capsule (500 mg total) by mouth 4 (four) times daily for 14 days. 56 capsule 0 11/25/2024    PEG 3350-KCl-Na Bicarb-NaCl (TRILYTE) 420 g Oral Recon Soln Take prep as directed by gastro office. May substitute with Trilyte/generic equivalent if needed. 4000 mL 0     Norgestimate-Eth Estradiol (SPRINTEC 28) 0.25-35 MG-MCG Oral Tab Take 1 tablet by mouth daily for 28 days. (Patient not taking: Reported on 10/9/2024) 28 tablet 0    [2]   Allergies  Allergen Reactions    Penicillins HIVES

## 2024-11-26 NOTE — ANESTHESIA PREPROCEDURE EVALUATION
Anesthesia PreOp Note    HPI:     Tammi De La Garza is a 34 year old female who presents for preoperative consultation requested by: ARMOND Khan MD    Date of Surgery: 11/26/2024    Procedure(s):  COLONOSCOPY / ESOPHAGOGASTRODUODENOSCOPY  ESOPHAGOGASTRODUODENOSCOPY (EGD)  Indication: Helicobacter pylori (H. pylori) infection / Gastroesophageal reflux disease with esophagitis without hemorrhage /Nausea / Abdominal pain, unspecified abdominal location / Constipation, unspecified constipation type / Diarrhea, unspecified type / BRBPR  (bright red blood per rectum) / Dark stools    Relevant Problems   No relevant active problems       NPO:  Last Liquid Consumption Date: 11/26/24  Last Liquid Consumption Time: 0415  Last Solid Consumption Date: 11/24/24  Last Solid Consumption Time: 1830  Last Liquid Consumption Date: 11/26/24          History Review:  Patient Active Problem List    Diagnosis Date Noted    Pregnancy ([facility]) 01/02/2023    Vaginal delivery ([facility])     Proteinuria affecting pregnancy, antepartum ([facility])     Anemia complicating pregnancy in second trimester ([facility]) 12/07/2022    PTSD (post-traumatic stress disorder) 07/18/2022    Peptic ulcer     Depression     Anxiety     Blindness of left eye 2017    Congenital birth defect 1990       Past Medical History:    Anemia complicating pregnancy in second trimester ([facility])    Anxiety    Blindness of left eye    Calculus of kidney    Chlamydia    Chlamydia infection    Congenital birth defect    casued optic damage    Decorative tattoo    Depression    History of adverse reaction to anesthesia    becomes \"aggitated\" when sedation initiated    History of suicidal ideation    hospitalized [facility]    History of suicidal ideation    hospitalized [place]    Peptic ulcer    H. Pylori    Sexually transmitted disease       Past Surgical History:   Procedure Laterality Date    Adenoidectomy      Create eardrum opening,gen anesth      D & c      Egd         Prescriptions  Prior to Admission[1]  Current Medications and Prescriptions Ordered in Epic[2]    Allergies[3]    Family History   Problem Relation Age of Onset    Cancer Mother     Ovarian Cancer Mother      Social History     Socioeconomic History    Marital status: Single   Tobacco Use    Smoking status: Former    Smokeless tobacco: Never   Vaping Use    Vaping status: Never Used   Substance and Sexual Activity    Alcohol use: Not Currently    Drug use: Not Currently       Available pre-op labs reviewed.  Lab Results   Component Value Date    MCV 94.7 10/07/2024    MCHC 32.4 10/07/2024    URINEPREG Negative 11/26/2024             Vital Signs:  Body mass index is 37.93 kg/m².   height is 1.676 m (5' 6\") and weight is 106.6 kg (235 lb). Her blood pressure is 99/55 and her pulse is 86. Her respiration is 18 and oxygen saturation is 96%.   Vitals:    11/21/24 1409 11/26/24 0714   BP:  99/55   Pulse:  86   Resp:  18   SpO2:  96%   Weight: 106.6 kg (235 lb)    Height: 1.676 m (5' 6\")         Anesthesia Evaluation     Patient summary reviewed and Nursing notes reviewed    Airway   Mallampati: IV  TM distance: >3 FB  Neck ROM: full  Dental - Dentition appears grossly intact     Pulmonary - negative ROS and normal exam   Cardiovascular - negative ROS and normal exam    Neuro/Psych    (+)  anxiety/panic attacks,  depression      GI/Hepatic/Renal - negative ROS     Endo/Other - negative ROS   Abdominal  - normal exam                 Anesthesia Plan:   ASA:  2  Plan:   MAC  Informed Consent Plan and Risks Discussed With:  Patient  Discussed plan with:  Surgeon      I have informed Tammi De La Garza and/or legal guardian or family member of the nature of the anesthetic plan, benefits, risks including possible dental damage if relevant, major complications, and any alternative forms of anesthetic management.   All of the patient's questions were answered to the best of my ability. The patient desires the anesthetic management as  planned.  NICOLETTE SOTO, ODETTE  11/26/2024 7:58 AM  Present on Admission:  **None**           [1]   Medications Prior to Admission   Medication Sig Dispense Refill Last Dose/Taking    albuterol 108 (90 Base) MCG/ACT Inhalation Aero Soln Inhale 2 puffs into the lungs every 4 (four) hours as needed.   Taking As Needed    Phenyleph-Shark Fanny O-Glyc-Pet (PREPARATION H RE) Place rectally.   Taking    pantoprazole 40 MG Oral Tab EC Take 1 tablet (40 mg total) by mouth daily. 30 tablet 3 11/25/2024    bismuth subsalicylate 262 MG Oral Chew Tab Chew 2 tablets (524 mg total) by mouth 4 (four) times daily for 14 days. 112 tablet 0     metRONIDAZOLE 250 MG Oral Tab Take 1 tablet (250 mg total) by mouth 4 (four) times daily for 14 days. 56 tablet 0 11/25/2024    Tetracycline HCl 500 MG Oral Cap Take 1 capsule (500 mg total) by mouth 4 (four) times daily for 14 days. 56 capsule 0 11/25/2024    PEG 3350-KCl-Na Bicarb-NaCl (TRILYTE) 420 g Oral Recon Soln Take prep as directed by gastro office. May substitute with Trilyte/generic equivalent if needed. 4000 mL 0     Norgestimate-Eth Estradiol (SPRINTEC 28) 0.25-35 MG-MCG Oral Tab Take 1 tablet by mouth daily for 28 days. (Patient not taking: Reported on 10/9/2024) 28 tablet 0    [2]   Current Facility-Administered Medications Ordered in Epic   Medication Dose Route Frequency Provider Last Rate Last Admin    lactated ringers infusion   Intravenous Continuous ARMOND Khan MD 20 mL/hr at 11/26/24 0721 New Bag at 11/26/24 0721     No current [facility]-ordered outpatient medications on file.   [3]   Allergies  Allergen Reactions    Penicillins HIVES

## 2024-11-26 NOTE — ANESTHESIA POSTPROCEDURE EVALUATION
Patient: Tammi De La Garza    Procedure Summary       Date: 11/26/24 Room / Location: [facility] ENDOSCOPY 03 / [facility] ENDOSCOPY    Anesthesia Start: 0808 Anesthesia Stop:     Procedures:       COLONOSCOPY / ESOPHAGOGASTRODUODENOSCOPY      ESOPHAGOGASTRODUODENOSCOPY (EGD) Diagnosis:       Helicobacter pylori (H. pylori) infection      Gastroesophageal reflux disease with esophagitis without hemorrhage      Nausea      Abdominal pain, unspecified abdominal location      Constipation, unspecified constipation type      Diarrhea, unspecified type      BRBPR (bright red blood per rectum)      Dark stools      (hiatal hernia, colon polyps and hemorrhoids)    Surgeons: ARMOND Khan MD Anesthesiologist: Laurel Soto CRNA    Anesthesia Type: MAC ASA Status: 2            Anesthesia Type: MAC    Vitals Value Taken Time   BP 91/51 11/26/24 0838   Temp 98 °F (36.7 °C) 11/26/24 0838   Pulse 82 11/26/24 0839   Resp 28 11/26/24 0839   SpO2 95 % 11/26/24 0839   Vitals shown include unfiled device data.    [facility] AN Post Evaluation:   Patient Evaluated in PACU  Patient Participation: complete - patient participated  Level of Consciousness: sleepy but conscious  Pain Score: 0  Pain Management: adequate  Airway Patency:patent  Dental exam unchanged from preop  Yes    Cardiovascular Status: stable and acceptable  Respiratory Status: acceptable and room air  Postoperative Hydration acceptable      LAUREL SOTO CRNA  11/26/2024 8:39 AM

## 2024-11-26 NOTE — DISCHARGE INSTRUCTIONS
Home Care Instructions for Colonoscopy and/or Gastroscopy with Sedation    Diet:  - Resume your regular diet as tolerated unless otherwise instructed.  - Start with light meals to minimize bloating.  - Do not drink alcohol today.    Medication:  - If you have questions about resuming your normal medications, please contact your Primary Care Physician.    Activities:  - Take it easy today. Do not return to work today.  - Do not drive today.  - Do not operate any machinery today (including kitchen equipment).    Colonoscopy:  - You may notice some rectal \"spotting\" (a little blood on the toilet tissue) for a day or two after the exam. This is normal.  - If you experience any rectal bleeding (not spotting), persistent tenderness or sharp severe abdominal pains, oral temperature over 100 degrees Fahrenheit, light-headedness or dizziness, or any other problems, contact your doctor.    Gastroscopy:  - You may have a sore throat for 2-3 days following the exam. This is normal. Gargling with warm salt water (1/2 tsp salt to 1 glass warm water) or using throat lozenges will help.  - If you experience any sharp pain in your neck, abdomen or chest, vomiting of blood, oral temperature over 100 degrees Fahrenheit, light-headedness or dizziness, or any other problems, contact your doctor.    **If unable to reach your doctor, please go to the [facility]**    - Your referring physician will receive a full report of your examination.  - If you do not hear from your doctor's office within two weeks of your biopsy, please call them for your results.    You may be able to see your laboratory results in [employer] between 4 and 7 business days.  In some cases, your physician may not have viewed the results before they are released to [employer].  If you have questions regarding your results contact the physician who ordered the test/exam by phone or via [employer] by choosing \"Ask a Medical Question.\"

## 2024-11-26 NOTE — OPERATIVE REPORT
ESOPHAGOGASTRODUODENOSCOPY (EGD) & COLONOSCOPY REPORT    Tammi De La Garza     3/11/1990 Age 34 year old   PCP Elizabet Lambert MD Endoscopist Sudhakar Khan MD     Date of procedure: 24    Procedure:   EGD w/cold biopsy   Colonoscopy w/cold snare polypectomy + cold biopsy    Pre-operative diagnosis: change in bowels, nausea, ab pain     Post-operative diagnosis: Hiatal hernia, colon polyp x1, internal hemorrhoids    Medications: MAC    Withdrawal time: 10 minutes    Complications: none    Procedure: Informed consent was obtained from the patient after the risks of the procedure were discussed, including but not limited to bleeding, perforation, aspiration, infection, or possibility of a missed lesion. We discussed the risks/benefits and alternatives to this procedure, as well as the planned sedation. EGD procedure: The patient was placed in the left lateral decubitus position and begun on continuous blood pressure pulse oximetry and EKG monitoring and this was maintained throughout the procedure. Once an adequate level of sedation was obtained a bite block was placed. Then the lubricated tip of the [id number] diagnostic video upper endoscope was inserted and advanced using direct visualization into the posterior pharynx and ultimately into the esophagus. Colonoscopy procedure: Once an adequate level of sedation was obtained a digital rectal exam was completed. Then the lubricated tip of the [id number] diagnostic video colonoscope was inserted and advanced without difficulty to the cecum using the CO2 insufflation technique. The cecum was identified by localizing the trifold, the appendix and the ileocecal valve. A routine second examination of the cecum/ascending colon was performed. Withdrawal was begun with thorough washing and careful examination of the colonic walls and folds. Photodocumentation was obtained. The bowel prep was good. Views of the colon were good with washing. I  then carefully withdrew the instrument from the patient who tolerated the procedure well.     Complications: None    EGD findings:      1. Esophagus: The squamocolumnar junction was noted at 38 cm and appeared regular. The diaphragmatic pinch was noted noted at 40 cm from the incisors. A 2 cm sliding hiatal hernia is noted. The esophageal mucosa appeared normal. There was no evidence of esophagitis, stricture or endoscopic evidence of Hobbs's esophagus.  2. Stomach: The stomach distended normally. Normal rugal folds were seen. The pylorus was patent. The gastric mucosa appeared normal s/p random biopsies. Retroflexion revealed a normal fundus and a mildly-patulous cardia. Due to coughing/retching during the procedure (despite our best efforts to control this), visualization was poor at times.  3. Duodenum: The duodenal mucosa appeared normal in the 1st and 2nd portion of the duodenum.     Colonoscopy findings:    1. One polyp(s) noted as follows:      A. 8 mm polyp in the descending colon; sessile morphology; cold snare polypectomy and retrieved.  2. Diverticulosis: none.  3. Terminal ileum: the visualized mucosa appeared normal.  4. The colonic mucosa throughout the colon showed normal vascular pattern, without evidence of angioectasias or inflammation. S/p random biopsies.  5. A retroflexed view of the rectum revealed small internal hemorrhoids.  6. CHRISTOPHER: normal rectal tone, no masses palpated.     Impression:  EGD shows a small hiatal hernia, otherwise normal. DIFFICULT upper endoscopy. Due to coughing/retching during the procedure (despite our best efforts to control this), visualization was poor at times.  CLN shows one small polyp (removed), internal hemorrhoids. Patient tolerated the colonoscopy much better. No inflammatory bowel disease noted. If biopsies are normal, suspect IBS-M.    Recommend:  Await pathology. The interval for the next colonoscopy will be determined after reviewing pathology. If new  signs or symptoms develop, colonoscopy may need to be repeated sooner.   High fiber diet.  Monitor for blood in the stool. If having more than just tinge of blood, call office or go to the ER.  Follow-up with Cornelia Luque DNP in GI clinic in 2-4 months. Call [phone] for appt or arrange on mychart if available.  Finish antibiotics for Hpylori.   Continue PPI.     >>>If tissue was obtained and you have not received your pathology results either by phone or letter within 2 weeks, please call our office at [phone].    Specimens: gastric, colon  Blood loss: <1 ml    ----------------------------------------------------------------------------------------------------------------------------------    INTERVAL FOR COLONOSCOPY:   - If there is no family history of colon cancer and no colon polyps identified in an adequately prepped colon - colonoscopy should be repeated in 10 years. Various factors should be considered regarding repeat colonoscopy - including co-morbid conditions, ability to tolerate procedure with advanced age, and desire for repeat testing.   - If no colon polyps were identified and a positive family history of colon cancer - the colonoscopy should be repeated in 5 years.   - If colon polyps were removed, the colonoscopy should be repeated sooner depending on size/type/location of polyp.

## 2024-11-27 VITALS
HEIGHT: 66 IN | DIASTOLIC BLOOD PRESSURE: 56 MMHG | HEART RATE: 66 BPM | SYSTOLIC BLOOD PRESSURE: 101 MMHG | RESPIRATION RATE: 22 BRPM | OXYGEN SATURATION: 99 % | WEIGHT: 235 LBS | BODY MASS INDEX: 37.77 KG/M2 | TEMPERATURE: 98 F

## 2024-12-11 ENCOUNTER — APPOINTMENT (OUTPATIENT)
Dept: GENERAL RADIOLOGY | Age: 34
End: 2024-12-11
Attending: NURSE PRACTITIONER
Payer: MEDICAID

## 2024-12-11 ENCOUNTER — HOSPITAL ENCOUNTER (OUTPATIENT)
Age: 34
Discharge: HOME | End: 2024-12-11
Payer: MEDICAID

## 2024-12-11 VITALS
DIASTOLIC BLOOD PRESSURE: 74 MMHG | TEMPERATURE: 99 F | RESPIRATION RATE: 20 BRPM | OXYGEN SATURATION: 97 % | HEART RATE: 92 BPM | SYSTOLIC BLOOD PRESSURE: 107 MMHG
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
## 2024-12-11 DIAGNOSIS — S93.601A SPRAIN OF RIGHT FOOT, INITIAL ENCOUNTER: ICD-10-CM

## 2024-12-11 PROCEDURE — 73630 X-RAY EXAM OF FOOT: CPT | Performed by: NURSE PRACTITIONER

## 2024-12-11 PROCEDURE — 99213 OFFICE O/P EST LOW 20 MIN: CPT | Performed by: NURSE PRACTITIONER

## 2024-12-11 PROCEDURE — L3260 AMBULATORY SURGICAL BOOT EAC: HCPCS | Performed by: NURSE PRACTITIONER

## 2024-12-11 PROCEDURE — 73610 X-RAY EXAM OF ANKLE: CPT | Performed by: NURSE PRACTITIONER

## 2024-12-11 NOTE — ED INITIAL ASSESSMENT (HPI)
Fell downstairs, landed on directly on R toe and foot. R ankle/foot pain since then. Experiencing toe numbness.

## 2024-12-11 NOTE — DISCHARGE INSTRUCTIONS
Ice and elevate the foot.  Tylenol or ibuprofen as needed for pain.  Wear the postop shoe when ambulatory.  Follow-up with podiatry.  Return for any concerns.

## 2024-12-11 NOTE — ED PROVIDER NOTES
He    Patient Seen in: [facility]      History     Chief Complaint   Patient presents with    Foot Injury    Ankle Injury     Stated Complaint: rt leg/foot injury/numbness x 2wk  Subjective:   34-year-old female presents for a right foot and ankle injury that occurred 2 weeks ago.  She states she slipped down a couple stairs and landed on the distal aspect of her foot and it bended under her.  She is having pain to the base of the toes with some tingling in her toes.  She is also having pain to the lateral aspect of the foot that radiates into the lateral ankle.  She is bearing weight.  No visible deformity.  Moving the toes.  The skin and nailbeds are intact.  No calf redness, pain, or swelling.  She does have a history of an ankle fracture x 2 in the past.  She denies any other injuries from the fall.  She appears nontoxic.      Objective:   Past Medical History:    Anemia complicating pregnancy in second trimester (HCC)    Anxiety    Blindness of left eye    Calculus of kidney    Chlamydia    Chlamydia infection    Congenital birth defect    casued optic damage    Decorative tattoo    Depression    History of adverse reaction to anesthesia    becomes \"aggitated\" when sedation initiated    History of suicidal ideation    hospitalized [place]    History of suicidal ideation    hospitalized [place]    Peptic ulcer    H. Pylori    Sexually transmitted disease            Past Surgical History:   Procedure Laterality Date    Adenoidectomy      Colonoscopy N/A 11/26/2024    Procedure: COLONOSCOPY;  Surgeon: ARMOND Khan MD;  Location: [facility] ENDOSCOPY    Create eardrum opening,gen anesth      D & c      Egd                Social History     Socioeconomic History    Marital status: Single   Tobacco Use    Smoking status: Former    Smokeless tobacco: Never   Vaping Use    Vaping status: Never Used   Substance and Sexual Activity    Alcohol use: Not Currently    Drug use: Not Currently     Social Drivers  of Health     Financial Resource Strain: Not on File (2023)    Received from [facility]    Financial Resource Strain     Financial Resource Strain: 0   Food Insecurity: Not on File (2024)    Received from [facility]    Food Insecurity     Food: 0   Transportation Needs: Not on File (2023)    Received from [facility]    Transportation Needs     Transportation: 0   Physical Activity: Not on File (2023)    Received from [facility]    Physical Activity     Physical Activity: 0   Stress: Not on File (2023)    Received from [facility]    Stress     Stress: 0   Social Connections: Not on File (9/15/2024)    Received from [facility]    Social Connections     Connectedness: 0   Housing Stability: Not on File (2023)    Received from [facility]    Housing Stability     Housin            Review of Systems    Positive for stated complaint: Foot Injury and Ankle Injury     Other systems are as noted in HPI.  Constitutional and vital signs reviewed.      All other systems reviewed and negative except as noted above.    Physical Exam     ED Triage Vitals [24 1252]   /74   Pulse 92   Resp 20   Temp 98.5 °F (36.9 °C)   Temp src Oral   SpO2 97 %   O2 Device None (Room air)     Current:/74   Pulse 92   Temp 98.5 °F (36.9 °C) (Oral)   Resp 20   LMP  (Within Weeks)   SpO2 97%   Breastfeeding No     Physical Exam  Vitals reviewed.   Constitutional:       General: She is not in acute distress.     Appearance: Normal appearance. She is not toxic-appearing.   Cardiovascular:      Rate and Rhythm: Normal rate and regular rhythm.   Pulmonary:      Effort: Pulmonary effort is normal.      Breath sounds: Normal breath sounds.   Musculoskeletal:         General: Tenderness and signs of injury present. No swelling or deformity.      Comments: Pain to the base of the toes of the right foot with movement.  Moving the toes without difficulty.  Brisk cap refill.  Strong right pedal pulse.   The pain radiates into the lateral aspect of the foot and to the lateral aspect of the ankle.  No Achilles pain.  No heel pain.  No calf pain.  Ambulatory without difficulty.  CMS intact.   Skin:     General: Skin is warm and dry.      Capillary Refill: Capillary refill takes less than 2 seconds.      Findings: No bruising or erythema.   Neurological:      General: No focal deficit present.      Mental Status: She is alert and oriented to person, place, and time.   Psychiatric:         Mood and Affect: Mood normal.         Behavior: Behavior normal.         ED Course   XR FOOT, COMPLETE (MIN 3 VIEWS), RIGHT (CPT=73630)    Result Date: 12/11/2024  CONCLUSION:  1. Please see combined report with the x-ray of the right ankle the same day.     Dictated by (CST): Kishan Spear MD on 12/11/2024 at 1:34 PM     Finalized by (CST): Kishan Spear MD on 12/11/2024 at 1:34 PM          XR ANKLE (MIN 3 VIEWS), RIGHT (CPT=73610)    Result Date: 12/11/2024  CONCLUSION:  1. No acute appearing fracture or dislocation.  No significant arthropathy.  Mild lateral malleolar soft tissue swelling.    Dictated by (CST): Kishan Spear MD on 12/11/2024 at 1:31 PM     Finalized by (CST): Kishan Spear MD on 12/11/2024 at 1:34 PM         Labs Reviewed - No data to display    MDM     Medical Decision Making  The patient is aware of the x-ray results.  A postop shoe was applied for comfort.  We discussed supportive care including ice, elevation, and ibuprofen as needed for pain.  She is aware the injury is most likely a sprain.  I will refer to podiatry for follow-up.    Amount and/or Complexity of Data Reviewed  Radiology: ordered.     Details: The x-ray of the ankle and foot are negative for any acute fracture.  The patient is aware.    Risk  OTC drugs.  Risk Details: Fracture versus sprain        Disposition and Plan     Clinical Impression:  1. Fall    2. Sprain of right foot, initial encounter         Disposition:  Discharge  12/11/2024   1:38 pm    Follow-up:  Erica Oneal DPM  [address] [address]  [phone]    Call   to arrange follow up          Medications Prescribed:  Current Discharge Medication List

## 2024-12-12 ENCOUNTER — PATIENT MESSAGE (OUTPATIENT)
Dept: GASTROENTEROLOGY | Facility: CLINIC | Age: 34
End: 2024-12-12

## 2024-12-12 DIAGNOSIS — R19.7 DIARRHEA, UNSPECIFIED TYPE: Primary | ICD-10-CM

## 2024-12-13 NOTE — TELEPHONE ENCOUNTER
Cornelia-    [facility]    I spoke to the patient    Patient stated that she completed her H. Pylori treatment in full and is still experiencing some stomach irritation    Patient states that she has been taking pictures of her bowel movements to keep track of patterns. Per patient her bowel movements are still irregular. She switches between being constipated or having diarrhea. Yesterday and today she complains of having constipation    She states she has little to no appetite. When she does eat, she has rice, noodles, some vegetables    Yesterday she had a bowel movement and after taking a picture, she noticed small white strands in her stool (she was unable to see them with naked eye)    Patient denies rectal itching    Patient mentioned that she began having intense skin itching on legs last night that spread to her arms and chest. Patient applied some OTC cream without much relief    Please advise    Thank you

## 2024-12-16 ENCOUNTER — TELEPHONE (OUTPATIENT)
Facility: CLINIC | Age: 34
End: 2024-12-16

## 2024-12-16 NOTE — TELEPHONE ENCOUNTER
I contacted the pt by phone -    She reports diarrhea 6x/daily - baseline since onset of current sx.   Cln/egd 11/2024  Final Diagnosis:      A. Stomach; biopsy:  Gastric antral and oxyntic mucosa with marked chronic inactive gastritis.  Negative for intestinal metaplasia or dysplasia.  Diff-Quik stain (with appropriate control reactivity) is negative for H. pylori microorganisms.     B. Random colon; biopsy:  Colonic mucosa with no significant pathologic change.  No evidence of lymphocytic or collagenous colitis.     C. Descending colon polyp x1:   Tubular adenoma.     She was treated for hpylori    Has now had white strands in her stool. Blood from hemorrhoids.     Recommend:  Stool testing  Fiber supplement  Pepcid 40 mg twice daily  Er if condition decline

## 2024-12-16 NOTE — TELEPHONE ENCOUNTER
ARMOND Khan MD  [facility]  GI staff: please place recall for colonoscopy in 7 years.    Colonoscopy done on 11/26/2024   Letter mailed out to patient on  12/16/2024  Health Maintenance Updated and Patient Outreach was placed for Colon Recall

## 2024-12-24 ENCOUNTER — TELEPHONE (OUTPATIENT)
Facility: CLINIC | Age: 34
End: 2024-12-24

## 2024-12-24 NOTE — TELEPHONE ENCOUNTER
1st reminder sent about pending labs:    Scheduled US on 12/27/2024  US ABDOMEN COMPLETE (CPT=76700) (Order #[id number]) on 11/20/24     Pending labs:    C. diff toxigenic PCR (OPT)  Giardia + Crypto Antigen, Stool  Ova and Parasites with Giardia Antigen [Quest 1748]  Stool Culture W/ Shigatoxin

## 2024-12-28 ENCOUNTER — APPOINTMENT (OUTPATIENT)
Dept: LAB | Facility: HOSPITAL | Age: 34
End: 2024-12-28
Attending: NURSE PRACTITIONER
Payer: MEDICAID

## 2024-12-28 PROCEDURE — 87046 STOOL CULTR AEROBIC BACT EA: CPT

## 2024-12-28 PROCEDURE — 87045 FECES CULTURE AEROBIC BACT: CPT

## 2024-12-28 PROCEDURE — 87493 C DIFF AMPLIFIED PROBE: CPT

## 2024-12-28 PROCEDURE — 87329 GIARDIA AG IA: CPT

## 2024-12-28 PROCEDURE — 87427 SHIGA-LIKE TOXIN AG IA: CPT

## 2024-12-28 PROCEDURE — 87272 CRYPTOSPORIDIUM AG IF: CPT

## 2024-12-28 PROCEDURE — 87015 SPECIMEN INFECT AGNT CONCNTJ: CPT

## 2024-12-29 ENCOUNTER — LAB ENCOUNTER (OUTPATIENT)
Dept: LAB | Facility: HOSPITAL | Age: 34
End: 2024-12-29
Attending: NURSE PRACTITIONER
Payer: MEDICAID

## 2024-12-29 ENCOUNTER — HOSPITAL ENCOUNTER (EMERGENCY)
Facility: HOSPITAL | Age: 34
Discharge: HOME | End: 2024-12-29
Attending: EMERGENCY MEDICINE
Payer: MEDICAID

## 2024-12-29 ENCOUNTER — APPOINTMENT (OUTPATIENT)
Dept: CT IMAGING | Facility: HOSPITAL | Age: 34
End: 2024-12-29
Attending: EMERGENCY MEDICINE
Payer: MEDICAID

## 2024-12-29 VITALS
SYSTOLIC BLOOD PRESSURE: 102 MMHG | OXYGEN SATURATION: 99 % | RESPIRATION RATE: 16 BRPM | HEIGHT: 66 IN | HEART RATE: 71 BPM | DIASTOLIC BLOOD PRESSURE: 54 MMHG | BODY MASS INDEX: 36.96 KG/M2 | TEMPERATURE: 98 F | WEIGHT: 230 LBS

## 2024-12-29 DIAGNOSIS — R19.7 DIARRHEA, UNSPECIFIED TYPE: ICD-10-CM

## 2024-12-29 DIAGNOSIS — R10.9 ABDOMINAL PAIN OF UNKNOWN ETIOLOGY: Primary | ICD-10-CM

## 2024-12-29 LAB
ALBUMIN SERPL-MCNC: 4.6 G/DL (ref 3.2–4.8)
ALBUMIN/GLOB SERPL: 1.5 {RATIO} (ref 1–2)
ALP LIVER SERPL-CCNC: 87 U/L
ALT SERPL-CCNC: 24 U/L
ANION GAP SERPL CALC-SCNC: 6 MMOL/L (ref 0–18)
AST SERPL-CCNC: 24 U/L (ref ?–34)
B-HCG UR QL: NEGATIVE
BASOPHILS # BLD AUTO: 0.05 X10(3) UL (ref 0–0.2)
BASOPHILS NFR BLD AUTO: 0.6 %
BILIRUB SERPL-MCNC: 0.7 MG/DL (ref 0.3–1.2)
BILIRUB UR QL: NEGATIVE
BUN BLD-MCNC: 7 MG/DL (ref 9–23)
BUN/CREAT SERPL: 8.5 (ref 10–20)
CALCIUM BLD-MCNC: 9.4 MG/DL (ref 8.7–10.4)
CHLORIDE SERPL-SCNC: 109 MMOL/L (ref 98–112)
CO2 SERPL-SCNC: 23 MMOL/L (ref 21–32)
COLOR UR: YELLOW
CREAT BLD-MCNC: 0.82 MG/DL
CRYPTOSP AG STL QL IA: NEGATIVE
DEPRECATED RDW RBC AUTO: 47.5 FL (ref 35.1–46.3)
EGFRCR SERPLBLD CKD-EPI 2021: 96 ML/MIN/1.73M2 (ref 60–?)
EOSINOPHIL # BLD AUTO: 0.2 X10(3) UL (ref 0–0.7)
EOSINOPHIL NFR BLD AUTO: 2.2 %
ERYTHROCYTE [DISTWIDTH] IN BLOOD BY AUTOMATED COUNT: 13.2 % (ref 11–15)
G LAMBLIA AG STL QL IA: NEGATIVE
GLOBULIN PLAS-MCNC: 3 G/DL (ref 2–3.5)
GLUCOSE BLD-MCNC: 91 MG/DL (ref 70–99)
GLUCOSE UR-MCNC: NORMAL MG/DL
HCT VFR BLD AUTO: 46 %
HGB BLD-MCNC: 15.1 G/DL
IMM GRANULOCYTES # BLD AUTO: 0.02 X10(3) UL (ref 0–1)
IMM GRANULOCYTES NFR BLD: 0.2 %
LEUKOCYTE ESTERASE UR QL STRIP.AUTO: 500
LYMPHOCYTES # BLD AUTO: 3.19 X10(3) UL (ref 1–4)
LYMPHOCYTES NFR BLD AUTO: 35.5 %
MCH RBC QN AUTO: 31.9 PG (ref 26–34)
MCHC RBC AUTO-ENTMCNC: 32.8 G/DL (ref 31–37)
MCV RBC AUTO: 97.3 FL
MONOCYTES # BLD AUTO: 0.5 X10(3) UL (ref 0.1–1)
MONOCYTES NFR BLD AUTO: 5.6 %
NEUTROPHILS # BLD AUTO: 5.02 X10 (3) UL (ref 1.5–7.7)
NEUTROPHILS # BLD AUTO: 5.02 X10(3) UL (ref 1.5–7.7)
NEUTROPHILS NFR BLD AUTO: 55.9 %
NITRITE UR QL STRIP.AUTO: NEGATIVE
OSMOLALITY SERPL CALC.SUM OF ELEC: 284 MOSM/KG (ref 275–295)
PH UR: 5.5 [PH] (ref 5–8)
PLATELET # BLD AUTO: 346 10(3)UL (ref 150–450)
POTASSIUM SERPL-SCNC: 4.2 MMOL/L (ref 3.5–5.1)
PROT SERPL-MCNC: 7.6 G/DL (ref 5.7–8.2)
PROT UR-MCNC: 30 MG/DL
RBC # BLD AUTO: 4.73 X10(6)UL
SODIUM SERPL-SCNC: 138 MMOL/L (ref 136–145)
SP GR UR STRIP: 1.03 (ref 1–1.03)
UROBILINOGEN UR STRIP-ACNC: NORMAL
WBC # BLD AUTO: 9 X10(3) UL (ref 4–11)
WBC #/AREA URNS AUTO: >50 /HPF

## 2024-12-29 PROCEDURE — 87086 URINE CULTURE/COLONY COUNT: CPT | Performed by: EMERGENCY MEDICINE

## 2024-12-29 PROCEDURE — 99285 EMERGENCY DEPT VISIT HI MDM: CPT

## 2024-12-29 PROCEDURE — 96374 THER/PROPH/DIAG INJ IV PUSH: CPT

## 2024-12-29 PROCEDURE — 81001 URINALYSIS AUTO W/SCOPE: CPT | Performed by: EMERGENCY MEDICINE

## 2024-12-29 PROCEDURE — 74177 CT ABD & PELVIS W/CONTRAST: CPT | Performed by: EMERGENCY MEDICINE

## 2024-12-29 PROCEDURE — 80053 COMPREHEN METABOLIC PANEL: CPT | Performed by: EMERGENCY MEDICINE

## 2024-12-29 PROCEDURE — 99284 EMERGENCY DEPT VISIT MOD MDM: CPT

## 2024-12-29 PROCEDURE — 96361 HYDRATE IV INFUSION ADD-ON: CPT

## 2024-12-29 PROCEDURE — 85025 COMPLETE CBC W/AUTO DIFF WBC: CPT | Performed by: EMERGENCY MEDICINE

## 2024-12-29 PROCEDURE — 81025 URINE PREGNANCY TEST: CPT

## 2024-12-29 RX ORDER — CEPHALEXIN 500 MG/1
500 CAPSULE ORAL 2 TIMES DAILY
Qty: 10 CAPSULE | Refills: 0 | Status: SHIPPED | OUTPATIENT
Start: 2024-12-29 | End: 2025-01-03

## 2024-12-29 RX ORDER — MORPHINE SULFATE 4 MG/ML
4 INJECTION, SOLUTION INTRAMUSCULAR; INTRAVENOUS ONCE
Status: COMPLETED | OUTPATIENT
Start: 2024-12-29 | End: 2024-12-29

## 2024-12-29 NOTE — ED PROVIDER NOTES
Patient Seen in: [facility] Emergency Department    History   No chief complaint on file.      HPI    History is provided by patient/independent historian: Patient  34 year old female with history of kidney stone, chronic abdominal pain, here with complaints of abdominal pain.  She reports seeing white wormlike structures in her stool which she just onto stool sample today.  No fevers, no nausea, no diarrhea.    History reviewed.   Past Medical History:    Anemia complicating pregnancy in second trimester (HCC)    Anxiety    Blindness of left eye    Calculus of kidney    Chlamydia    Chlamydia infection    Colon polyp    repeat CLN in 7 years    Congenital birth defect    casued optic damage    Decorative tattoo    Depression    History of adverse reaction to anesthesia    becomes \"aggitated\" when sedation initiated    History of suicidal ideation    hospitalized [place]    History of suicidal ideation    hospitalized [facility]    Peptic ulcer    H. Pylori    Sexually transmitted disease         History reviewed.   Past Surgical History:   Procedure Laterality Date    Adenoidectomy      Colonoscopy N/A 11/26/2024    Procedure: COLONOSCOPY;  Surgeon: ARMOND Khan MD;  Location: [facility] ENDOSCOPY    Create eardrum opening,gen anesth      D & c      Egd  11/26/2024    Procedure: EGD; Surgeon: ARMOND Khan MD; Location: [facility] ENDOSCOPY         Home Medications reviewed :  Prescriptions Prior to Admission[1]      History reviewed.   Social History     Socioeconomic History    Marital status: Single   Tobacco Use    Smoking status: Former    Smokeless tobacco: Never   Vaping Use    Vaping status: Never Used   Substance and Sexual Activity    Alcohol use: Not Currently    Drug use: Not Currently         ROS  Review of Systems   Respiratory:  Negative for shortness of breath.    Cardiovascular:  Negative for chest pain.   Gastrointestinal:  Positive for abdominal pain.   All other systems reviewed and are  negative.     All other pertinent organ systems are reviewed and are negative.      Physical Exam     ED Triage Vitals [12/29/24 1101]   /62   Pulse 98   Resp 16   Temp 97.5 °F (36.4 °C)   Temp src Temporal   SpO2 97 %   O2 Device None (Room air)     Vital signs reviewed.      Physical Exam  Vitals and nursing note reviewed.   Cardiovascular:      Pulses: Normal pulses.   Pulmonary:      Effort: No respiratory distress.   Abdominal:      General: There is no distension.      Tenderness: There is abdominal tenderness (generalized).   Neurological:      Mental Status: She is alert.         ED Course       Labs:     Labs Reviewed   CBC WITH DIFFERENTIAL WITH PLATELET - Abnormal; Notable for the following components:       Result Value    RDW-SD 47.5 (*)     All other components within normal limits   COMP METABOLIC PANEL (14) - Abnormal; Notable for the following components:    BUN 7 (*)     BUN/CREA Ratio 8.5 (*)     All other components within normal limits   URINALYSIS WITH CULTURE REFLEX - Abnormal; Notable for the following components:    Clarity Urine Turbid (*)     Ketones Urine Trace (*)     Blood Urine 1+ (*)     Protein Urine 30 (*)     Leukocyte Esterase Urine 500 (*)     WBC Urine >50 (*)     RBC Urine 3-5 (*)     Bacteria Urine Rare (*)     Squamous Epi. Cells Few (*)     All other components within normal limits   POCT PREGNANCY URINE - Normal   RAINBOW DRAW LAVENDER   RAINBOW DRAW LIGHT GREEN   URINE CULTURE, ROUTINE         My EKG Interpretation:   As reviewed and Interpreted by me      Imaging Results Available and Reviewed while in ED:   CT ABDOMEN+PELVIS(CONTRAST ONLY)(CPT=74177)    Result Date: 12/29/2024  CONCLUSION:  1. No acute intra-abdominal process. 2. Stable subcentimeter nonobstructing left renal stone. 3. Lesser incidental findings as above.    Dictated by (CST): Vargas Esquivel MD on 12/29/2024 at 1:55 PM     Finalized by (CST): Vargas Esquivel MD on 12/29/2024 at 1:58 PM          My review and independent interpretation of CT images: no free air. Radiology report corroborates this in addition to other details as reported by them.      Decision rules/scores evaluated: none      Diagnostic labs/tests considered but not ordered: none    ED Medications Administered:   Medications   sodium chloride 0.9 % IV bolus 1,000 mL (1,000 mL Intravenous New Bag 12/29/24 1238)   morphINE PF 4 MG/ML injection 4 mg (4 mg Intravenous Given 12/29/24 1250)   iopamidol 76% (ISOVUE-370) injection for power injector (80 mL Intravenous Given 12/29/24 1349)            - pt concerned for urinary symptoms with rare bacteriuria - will treat with short course of keflex    MDM       Medical Decision Making      Differential Diagnosis: After obtaining the patient's history, performing the physical exam and reviewing the diagnostics, multiple initial diagnoses were considered based on the presenting problem including perforation, diverticulitis, parasitic infection    External document review: I personally reviewed available external medical records for any recent pertinent discharge summaries, testing, and procedures - the findings are as follows: 11/26/24 admission for H pylori infection/EGD    Complicating Factors: The patient already  has a past medical history of Anemia complicating pregnancy in second trimester (HCC) (12/07/2022), Anxiety, Blindness of left eye (2017), Calculus of kidney, Chlamydia, Chlamydia infection (2009), Colon polyp (2024), Congenital birth defect (1990), Decorative tattoo, Depression, History of adverse reaction to anesthesia, History of suicidal ideation (2017), History of suicidal ideation (2017), Peptic ulcer, and Sexually transmitted disease. to contribute to the complexity of this ED evaluation.    Procedures performed: none    Discussed management with physician/appropriate source: none    Considered admission/deescalation of care for: none    Social determinants of health affecting  patient care: none    Prescription medications considered: keflex, discussed continuing current medication regimen    The patient requires continuous monitoring for: abdominal pain    Shared decision making: discussed possible admission        Disposition and Plan     Clinical Impression:  1. Abdominal pain of unknown etiology        Disposition:  Discharge    Follow-up:  Cornelia Luque E, APRN  [address]  [facility] [address]  [phone]    Follow up        Medications Prescribed:  Current Discharge Medication List                         [1] (Not in a hospital admission)

## 2024-12-29 NOTE — ED INITIAL ASSESSMENT (HPI)
Dx with h.pylori late November and had a colonoscopy in there. PT states she saw white patches in her stool, and sent stool sample today.     +lower abd pain, diarrhea, \"all my organs hurt\", denies fevers.     Also reporting white patches in the back of her throat and \"it looks like a hole\". Speaking in full sentences without distress. Managing saliva.

## 2024-12-30 ENCOUNTER — TELEPHONE (OUTPATIENT)
Dept: GASTROENTEROLOGY | Facility: CLINIC | Age: 34
End: 2024-12-30

## 2024-12-30 ENCOUNTER — TELEPHONE (OUTPATIENT)
Facility: CLINIC | Age: 34
End: 2024-12-30

## 2024-12-30 LAB — C DIFF TOX B STL QL: POSITIVE

## 2024-12-30 RX ORDER — VANCOMYCIN HYDROCHLORIDE 125 MG/1
125 CAPSULE ORAL 4 TIMES DAILY
Qty: 40 CAPSULE | Refills: 0 | Status: SHIPPED | OUTPATIENT
Start: 2024-12-30 | End: 2025-01-09

## 2024-12-30 NOTE — TELEPHONE ENCOUNTER
Patient is requesting an alternative medication to Vancomycin.  Insurance will not cover it and it is $800 out of pocket.  Please call

## 2024-12-30 NOTE — TELEPHONE ENCOUNTER
Cornelia-    I let patient know that she should follow up with her primary care for the white patches    Patient is now concerned that the keflex she was prescribed for urinary symptoms yesterday and the Vancomycin will cause her to have further issues. She is worried it will cause her H. Pylori to come back again    She is wondering if it is a good idea to start taking probiotics. If so, she would like to know what brand you recommend    Please advise    Thank you

## 2024-12-30 NOTE — TELEPHONE ENCOUNTER
I contacted the pt.  Urine culture suggests contamination  She will stop keflex    She was able to fill her vancomycin.    We discussed precautions to prevent the spread of infection  Start florastor following antibiotics  Contact office and/or consider er if condition decline

## 2024-12-30 NOTE — TELEPHONE ENCOUNTER
I contacted the pt with the results of her cdiff testing.  No answer x 2. Lmtcb.  Recommend starting treatment with vancomycin 125 mg qid x 10 days.  Rx sent e-scribe.    Nursing:  Can you please let her know above?    Thanks,  Cornelia

## 2024-12-30 NOTE — TELEPHONE ENCOUNTER
Mercedes ROY reviewed the below note with the patient    Patient aware of new medication and that it was sent to pharmacy    Patient states she went to the ER yesterday due to how severe her abdominal pain was. At hospital, patient had labs drawn and CT done. Patient discharged home on Keflex for urinary symptoms    Patient wanted to make you aware that 2 weeks ago, she noticed some white patches on the back of her tongue and a lump in the back of her throat. She states that she is not in any pain, however the lump in the back of her throat is uncomfortable.    Patient states that she scraped the white patches off of her tongue prior to going to the ER on 12/29/2024 and now her tongue is raw. Per patient, when MD looked at her mouth/throat at the hospital, they did not see anything unusual    Please advise    Thank you

## 2024-12-30 NOTE — TELEPHONE ENCOUNTER
Cornelia-    Patient's insurance is not covering vancomycin.    Is there another medication you would like to prescribe or would you like to initiate PA for vancomycin?    Please advise    Thank you

## 2025-01-20 ENCOUNTER — TELEPHONE (OUTPATIENT)
Facility: CLINIC | Age: 35
End: 2025-01-20

## 2025-01-20 NOTE — TELEPHONE ENCOUNTER
Patient outreach message received:    8 week h pylori recall entered into patient outreach in Epic

## 2025-01-20 NOTE — TELEPHONE ENCOUNTER
[facility]  Last read by Tammi De La Garza at  2:47 PM on 1/20/2025.

## 2025-01-20 NOTE — TELEPHONE ENCOUNTER
I called the patient, no answer    Left message for patient to call back    [facility]

## 2025-01-23 NOTE — ED QUICK NOTES
Patient presents with:  Chest pain    Patient is A&Ox4 , arrived via private car with c/o chest pain that started about 10 min PTA. Patient stated she felt short of breath and dizzy when it happened. Approx 25 weeks pregnant - . Patient stated she is light headed at this time    Patient changed into gown. Side rails up x2, call light within reach, blanket provided. Pt here with vomiting and diarrhea in setting of chronic methadone maintenance but vomited his methadone for past 2 days. Here in ED, HR 130s, nontoxic appearing. Appears to be in active opiate withdrawal. No abd tenderness on exam. Low suspicion for appendicitis, diverticulitis, testicular torsion, epididymoorchitis given no lower abd tenderness on exam and no concerning  sx. Low suspicion for pancreatitis, cholecystitis, choledocholithiasis, cholangitis given no upper abd tenderness or s/sx of obstructive jaundice. Low suspicion for SBO given no abd distension, normal BMs/passing gas, and no prior abd surgeries or risk factors for SBO. Low suspicion for hernia strangulation or incarceration given no e/o hernia on exam, no overlying skin changes. No further imaging indicated at this time. Plan for labs r/o pancreatitis, electrolyte derangement, dehydration. Supportive care and reassess. Labs notable for mildly elevated lactate likely 2/2 vomiting. Repeat HR improved. Pt feels better and tolerated po. Methadone clinic called, confirmed that he received his AM dose today but vomited after. Pt to go back to methadone clinic to get repeat dose. Considered observation vs admission however pt is a good candidate for d/c home with outpatient f/u given that no life threatening pathology found in ED and pt has close outpatient f/u. Strict ED return precautions given. Pt verbalized understanding and was agreeable with plan.

## 2025-02-10 ENCOUNTER — TELEPHONE (OUTPATIENT)
Facility: CLINIC | Age: 35
End: 2025-02-10

## 2025-02-10 NOTE — TELEPHONE ENCOUNTER
1ST Letter sent out to patient's home- cs        C. diff toxigenic PCR (OPT)    Giardia + Crypto Antigen, Stool    Stool Culture W/ Shigatoxin    Ova and Parasites with Giardia Antigen [Quest [id number]]

## 2025-02-13 ENCOUNTER — NURSE ONLY (OUTPATIENT)
Dept: LAB | Age: 35
End: 2025-02-13
Attending: FAMILY MEDICINE
Payer: MEDICAID

## 2025-02-13 ENCOUNTER — LAB ENCOUNTER (OUTPATIENT)
Dept: LAB | Age: 35
End: 2025-02-13
Attending: NURSE PRACTITIONER
Payer: MEDICAID

## 2025-02-13 DIAGNOSIS — A04.8 HELICOBACTER PYLORI (H. PYLORI) INFECTION: ICD-10-CM

## 2025-02-13 PROCEDURE — 83013 H PYLORI (C-13) BREATH: CPT

## 2025-02-16 LAB — H PYLORI BREATH TEST: NEGATIVE

## 2025-02-25 ENCOUNTER — HOSPITAL ENCOUNTER (OUTPATIENT)
Dept: ULTRASOUND IMAGING | Age: 35
Discharge: HOME | End: 2025-02-25
Attending: NURSE PRACTITIONER
Payer: MEDICAID

## 2025-02-25 DIAGNOSIS — R10.9 ABDOMINAL PAIN, UNSPECIFIED ABDOMINAL LOCATION: ICD-10-CM

## 2025-02-25 DIAGNOSIS — A04.8 HELICOBACTER PYLORI (H. PYLORI) INFECTION: ICD-10-CM

## 2025-02-25 DIAGNOSIS — K21.00 GASTROESOPHAGEAL REFLUX DISEASE WITH ESOPHAGITIS WITHOUT HEMORRHAGE: ICD-10-CM

## 2025-02-25 PROCEDURE — 76700 US EXAM ABDOM COMPLETE: CPT | Performed by: NURSE PRACTITIONER

## 2025-02-28 ENCOUNTER — HOSPITAL ENCOUNTER (EMERGENCY)
Facility: HOSPITAL | Age: 35
Discharge: HOME | End: 2025-02-28
Payer: MEDICAID

## 2025-02-28 ENCOUNTER — TELEPHONE (OUTPATIENT)
Facility: CLINIC | Age: 35
End: 2025-02-28

## 2025-02-28 ENCOUNTER — APPOINTMENT (OUTPATIENT)
Dept: CT IMAGING | Facility: HOSPITAL | Age: 35
End: 2025-02-28
Attending: NURSE PRACTITIONER
Payer: MEDICAID

## 2025-02-28 VITALS
OXYGEN SATURATION: 95 % | RESPIRATION RATE: 14 BRPM | SYSTOLIC BLOOD PRESSURE: 105 MMHG | TEMPERATURE: 98 F | DIASTOLIC BLOOD PRESSURE: 86 MMHG | BODY MASS INDEX: 36 KG/M2 | WEIGHT: 220 LBS | HEART RATE: 78 BPM

## 2025-02-28 DIAGNOSIS — K80.20 CALCULUS OF GALLBLADDER WITHOUT CHOLECYSTITIS WITHOUT OBSTRUCTION: Primary | ICD-10-CM

## 2025-02-28 DIAGNOSIS — R10.9 ABDOMINAL PAIN, ACUTE: Primary | ICD-10-CM

## 2025-02-28 DIAGNOSIS — R11.2 NAUSEA AND VOMITING, UNSPECIFIED VOMITING TYPE: ICD-10-CM

## 2025-02-28 LAB
ALBUMIN SERPL-MCNC: 4.7 G/DL (ref 3.2–4.8)
ALBUMIN/GLOB SERPL: 1.4 {RATIO} (ref 1–2)
ALP LIVER SERPL-CCNC: 118 U/L
ALT SERPL-CCNC: 41 U/L
ANION GAP SERPL CALC-SCNC: 8 MMOL/L (ref 0–18)
AST SERPL-CCNC: 36 U/L (ref ?–34)
BASOPHILS # BLD AUTO: 0.06 X10(3) UL (ref 0–0.2)
BASOPHILS NFR BLD AUTO: 0.5 %
BILIRUB SERPL-MCNC: 0.5 MG/DL (ref 0.3–1.2)
BUN BLD-MCNC: 10 MG/DL (ref 9–23)
BUN/CREAT SERPL: 11.8 (ref 10–20)
CALCIUM BLD-MCNC: 9.4 MG/DL (ref 8.7–10.4)
CHLORIDE SERPL-SCNC: 106 MMOL/L (ref 98–112)
CO2 SERPL-SCNC: 23 MMOL/L (ref 21–32)
CREAT BLD-MCNC: 0.85 MG/DL
DEPRECATED RDW RBC AUTO: 44.9 FL (ref 35.1–46.3)
EGFRCR SERPLBLD CKD-EPI 2021: 92 ML/MIN/1.73M2 (ref 60–?)
EOSINOPHIL # BLD AUTO: 0.19 X10(3) UL (ref 0–0.7)
EOSINOPHIL NFR BLD AUTO: 1.7 %
ERYTHROCYTE [DISTWIDTH] IN BLOOD BY AUTOMATED COUNT: 13.2 % (ref 11–15)
GLOBULIN PLAS-MCNC: 3.4 G/DL (ref 2–3.5)
GLUCOSE BLD-MCNC: 102 MG/DL (ref 70–99)
HCG SERPL QL: NEGATIVE
HCT VFR BLD AUTO: 46.4 %
HGB BLD-MCNC: 15.7 G/DL
IMM GRANULOCYTES # BLD AUTO: 0.03 X10(3) UL (ref 0–1)
IMM GRANULOCYTES NFR BLD: 0.3 %
LIPASE SERPL-CCNC: 37 U/L (ref 12–53)
LYMPHOCYTES # BLD AUTO: 3.7 X10(3) UL (ref 1–4)
LYMPHOCYTES NFR BLD AUTO: 33 %
MCH RBC QN AUTO: 31.6 PG (ref 26–34)
MCHC RBC AUTO-ENTMCNC: 33.8 G/DL (ref 31–37)
MCV RBC AUTO: 93.4 FL
MONOCYTES # BLD AUTO: 0.71 X10(3) UL (ref 0.1–1)
MONOCYTES NFR BLD AUTO: 6.3 %
NEUTROPHILS # BLD AUTO: 6.52 X10 (3) UL (ref 1.5–7.7)
NEUTROPHILS # BLD AUTO: 6.52 X10(3) UL (ref 1.5–7.7)
NEUTROPHILS NFR BLD AUTO: 58.2 %
OSMOLALITY SERPL CALC.SUM OF ELEC: 283 MOSM/KG (ref 275–295)
PLATELET # BLD AUTO: 318 10(3)UL (ref 150–450)
POTASSIUM SERPL-SCNC: 3.9 MMOL/L (ref 3.5–5.1)
PROT SERPL-MCNC: 8.1 G/DL (ref 5.7–8.2)
RBC # BLD AUTO: 4.97 X10(6)UL
SODIUM SERPL-SCNC: 137 MMOL/L (ref 136–145)
TROPONIN I SERPL HS-MCNC: <3 NG/L
WBC # BLD AUTO: 11.2 X10(3) UL (ref 4–11)

## 2025-02-28 PROCEDURE — 80053 COMPREHEN METABOLIC PANEL: CPT

## 2025-02-28 PROCEDURE — 93005 ELECTROCARDIOGRAM TRACING: CPT

## 2025-02-28 PROCEDURE — 74177 CT ABD & PELVIS W/CONTRAST: CPT | Performed by: NURSE PRACTITIONER

## 2025-02-28 PROCEDURE — 83690 ASSAY OF LIPASE: CPT | Performed by: NURSE PRACTITIONER

## 2025-02-28 PROCEDURE — 96360 HYDRATION IV INFUSION INIT: CPT

## 2025-02-28 PROCEDURE — 84484 ASSAY OF TROPONIN QUANT: CPT | Performed by: NURSE PRACTITIONER

## 2025-02-28 PROCEDURE — 99284 EMERGENCY DEPT VISIT MOD MDM: CPT

## 2025-02-28 PROCEDURE — 93010 ELECTROCARDIOGRAM REPORT: CPT

## 2025-02-28 PROCEDURE — 84703 CHORIONIC GONADOTROPIN ASSAY: CPT | Performed by: NURSE PRACTITIONER

## 2025-02-28 PROCEDURE — 85025 COMPLETE CBC W/AUTO DIFF WBC: CPT

## 2025-02-28 RX ORDER — HYDROCODONE BITARTRATE AND ACETAMINOPHEN 5; 325 MG/1; MG/1
1 TABLET ORAL ONCE
Status: COMPLETED | OUTPATIENT
Start: 2025-02-28 | End: 2025-02-28

## 2025-02-28 RX ORDER — HYDROCODONE BITARTRATE AND ACETAMINOPHEN 7.5; 325 MG/1; MG/1
1-2 TABLET ORAL EVERY 6 HOURS PRN
Qty: 10 TABLET | Refills: 0 | Status: SHIPPED | OUTPATIENT
Start: 2025-02-28 | End: 2025-03-05

## 2025-02-28 NOTE — TELEPHONE ENCOUNTER
Called and talked with patient.   She is waiting for a family member to get home to take her to the ER.     On Tuesday symptoms began with diarrhea and projectile vomiting. Her last BM was on the 26th. Now has not had a BM and continues to have abdominal pain that radiates to her chest.   Denies SOB, dizziness and light headedness.   No overt bleeding    Discussed concern for possible obstruction or cardiac cause for chest pain. Agree with her plan to go to the ER.     Shannon Sanchez PA-C

## 2025-02-28 NOTE — ED INITIAL ASSESSMENT (HPI)
Pt c/o abdominal pain since Tuesday. Pt states unable to pass gas or have bowel movement since Wednesday. Had c.diff in january

## 2025-02-28 NOTE — TELEPHONE ENCOUNTER
Patient is requesting 2/23/25 ultrasound  results. (Pending)    Patient states that she is having extreme symptoms of not being able to eat due to Right Abdominal pain that radiates to the back and she is feeling very dizzy.  She states that she is unable to pass gas or have a bowel movements.  Please call

## 2025-02-28 NOTE — TELEPHONE ENCOUNTER
Cornelia/ Shannon Sanchez (office on call)-    I spoke to the patient    Patient states that since Monday, she has been having severe abdominal pain (rates pain anywhere from 6-10/10). She says the pain will sometimes radiate to her chest (denies SOB, numbness/tingling)    Patient states her abdominal pain is exacerbated when she eats/drinks. Patient states she is not able to tolerate solid foods. Her last solid food item was on Monday evening in which she \"projectile vomited\" after. Patient has only been consuming meal replacement shakes. She states she has been feeling very dizzy    She has not had a bowel movement since Tuesday 2/25/2025 and is not able to pass gas    Patient had abdominal ultrasound done on 2/25/2025, results not available at this time    Patient made aware of ER precautions    Please advise    Thank you

## 2025-03-01 NOTE — ED QUICK NOTES
Pt unable to urinate at this time, states no possibility of pregnancy. Pt states \"I havent had sex in 2 years\". Provider informed and ok to waive test for CT. Ct informed.

## 2025-03-01 NOTE — ED PROVIDER NOTES
Patient Seen in: [facility] Emergency Department      History     Chief Complaint   Patient presents with    Abdomen/Flank Pain     Stated Complaint: abd pain    Subjective:   33yo/f w hx of depression, anxiety, anemia reports w c/o epigastric pain, vomiting, no bowel movements x 3 days. Reports recent treatment for c diff, has since resolved. Not on abx currently. Tuesday had \"projectile vomiting\" and pain, now without a bowel movement of flatus in 3 days. Continued pain. No fever. No chest pain. No cough or congestion.               Objective:     Past Medical History:    Anemia complicating pregnancy in second trimester (HCC)    Anxiety    Blindness of left eye    Calculus of kidney    Chlamydia    Chlamydia infection    Colon polyp    repeat CLN in 7 years    Congenital birth defect    casued optic damage    Decorative tattoo    Depression    History of adverse reaction to anesthesia    becomes \"aggitated\" when sedation initiated    History of suicidal ideation    hospitalized [facility]    History of suicidal ideation    hospitalized [place]    Peptic ulcer    H. Pylori    Sexually transmitted disease              Past Surgical History:   Procedure Laterality Date    Adenoidectomy      Colonoscopy N/A 11/26/2024    Procedure: COLONOSCOPY;  Surgeon: ARMOND Khan MD;  Location: [facility] ENDOSCOPY    Create eardrum opening,gen anesth      D & c      Egd  11/26/2024    Procedure: EGD; Surgeon: ARMOND Khan MD; Location: [facility] ENDOSCOPY                Social History     Socioeconomic History    Marital status: Single   Tobacco Use    Smoking status: Former    Smokeless tobacco: Never   Vaping Use    Vaping status: Never Used   Substance and Sexual Activity    Alcohol use: Not Currently    Drug use: Not Currently     Social Drivers of Health     Food Insecurity: Not on File (9/26/2024)    Received from [employer]    Food Insecurity     Food: 0   Transportation Needs: Not on File (12/20/2023)    Received from  [facility]    Transportation Needs     Transportation: 0   Housing Stability: Not on File (2023)    Received from [facility]    Housing Stability     Housin                  Physical Exam     ED Triage Vitals   BP 25 1630 104/62   Pulse 25 1630 (!) 127   Resp 25 1630 18   Temp 25 1630 98.4 °F (36.9 °C)   Temp src 25 1630 Oral   SpO2 25 1630 95 %   O2 Device 25 1930 None (Room air)       Current Vitals:   Vital Signs  BP: 105/86  Pulse: 78  Resp: 14  Temp: 98.4 °F (36.9 °C)  Temp src: Oral  MAP (mmHg): 91    Oxygen Therapy  SpO2: 95 %  O2 Device: None (Room air)        Physical Exam  Vitals and nursing note reviewed.   Constitutional:       General: She is not in acute distress.     Appearance: She is well-developed.   HENT:      Head: Normocephalic and atraumatic.      Nose: Nose normal.      Mouth/Throat:      Mouth: Mucous membranes are moist.   Eyes:      Conjunctiva/sclera: Conjunctivae normal.      Pupils: Pupils are equal, round, and reactive to light.   Cardiovascular:      Rate and Rhythm: Normal rate and regular rhythm.      Heart sounds: Normal heart sounds.   Pulmonary:      Effort: Pulmonary effort is normal.      Breath sounds: Normal breath sounds.   Abdominal:      General: Bowel sounds are normal.      Palpations: Abdomen is soft.   Musculoskeletal:         General: No tenderness or deformity. Normal range of motion.      Cervical back: Normal range of motion and neck supple.   Skin:     General: Skin is warm and dry.      Capillary Refill: Capillary refill takes less than 2 seconds.      Findings: No rash.      Comments: Normal color   Neurological:      General: No focal deficit present.      Mental Status: She is alert and oriented to person, place, and time.      GCS: GCS eye subscore is 4. GCS verbal subscore is 5. GCS motor subscore is 6.      Cranial Nerves: No cranial nerve deficit.      Gait: Gait normal.             ED Course     Labs  Reviewed   CBC WITH DIFFERENTIAL WITH PLATELET - Abnormal; Notable for the following components:       Result Value    WBC 11.2 (*)     All other components within normal limits   COMP METABOLIC PANEL (14) - Abnormal; Notable for the following components:    Glucose 102 (*)     AST 36 (*)     Alkaline Phosphatase 118 (*)     All other components within normal limits   LIPASE - Normal   TROPONIN I HIGH SENSITIVITY - Normal   HCG, BETA SUBUNIT, QUAL   UA HOLD     EKG    Rate, intervals and axes as noted on EKG Report.  Rate: 115  Rhythm: Sinus Rhythm  Reading: ST no ricardo no ectopy normal axis  Stable clinical condition  No comparison                  Impression  CONCLUSION:  1. No acute intra-abdominal finding.  No evidence of acute appendicitis.  2. Circumferential urinary bladder wall thickening, which may relate to incomplete distention or cystitis.  If there are referable symptoms, urinalysis correlation is requested.  3. Small peripherally enhancing 1.7 cm right ovarian cyst likely represents a physiologic corpus luteum.  4. Stable punctate nonobstructing left renal calculus.  No hydronephrosis/obstructive uropathy.  5. Lesser incidental findings as above.        Dictated by (CST): Isaias Melendez MD on 2/28/2025 at 7:30 PM      Finalized by (CST): Isaias Melendez MD on 2/28/2025 at 7:34 PM             MDM              Medical Decision Making  33yo/f w hx and exam as stated; c/o abd pain, vomiting    Resolved vomiting 2 days ago  Ct non acute  Normotensive  No leukocytosis  No tachycardia in room  No chest pain, PERC neg, HEART score 1, neg trop, unremarkable EKG  Normal lfts and lipase  No RUQ tenderness  No urinary symptoms    Plan  Dc to home  Close fu with GI      Amount and/or Complexity of Data Reviewed  Labs:  Decision-making details documented in ED Course.  Radiology:  Decision-making details documented in ED Course.  ECG/medicine tests:  Decision-making details documented in ED Course.    Risk  OTC  drugs.  Prescription drug management.        Disposition and Plan     Clinical Impression:  1. Abdominal pain, acute    2. Nausea and vomiting, unspecified vomiting type         Disposition:  Discharge  2/28/2025  7:57 pm    Follow-up:  Cornelia Luque, APRN  [address] [address]  [phone]    Follow up in 2 day(s)            Medications Prescribed:  Current Discharge Medication List        START taking these medications    Details   HYDROcodone-acetaminophen 7.5-325 MG Oral Tab Take 1-2 tablets by mouth every 6 (six) hours as needed for Pain.  Qty: 10 tablet, Refills: 0    Associated Diagnoses: Abdominal pain, acute                 Supplementary Documentation:

## 2025-03-03 LAB
ATRIAL RATE: 115 BPM
P AXIS: 59 DEGREES
P-R INTERVAL: 184 MS
Q-T INTERVAL: 326 MS
QRS DURATION: 78 MS
QTC CALCULATION (BEZET): 450 MS
R AXIS: 58 DEGREES
T AXIS: 65 DEGREES
VENTRICULAR RATE: 115 BPM

## 2025-03-04 ENCOUNTER — TELEPHONE (OUTPATIENT)
Dept: SURGERY | Facility: CLINIC | Age: 35
End: 2025-03-04

## 2025-03-04 ENCOUNTER — OFFICE VISIT (OUTPATIENT)
Dept: SURGERY | Facility: CLINIC | Age: 35
End: 2025-03-04

## 2025-03-04 VITALS
HEART RATE: 90 BPM | BODY MASS INDEX: 35.36 KG/M2 | HEIGHT: 66 IN | DIASTOLIC BLOOD PRESSURE: 99 MMHG | WEIGHT: 220 LBS | SYSTOLIC BLOOD PRESSURE: 137 MMHG

## 2025-03-04 DIAGNOSIS — K80.20 CALCULUS OF GALLBLADDER WITHOUT CHOLECYSTITIS WITHOUT OBSTRUCTION: Primary | ICD-10-CM

## 2025-03-04 PROCEDURE — 99204 OFFICE O/P NEW MOD 45 MIN: CPT | Performed by: SURGERY

## 2025-03-04 NOTE — H&P (VIEW-ONLY)
HPI:    Patient ID: Tammi De La Garza is a 34 year old female presenting with   Chief Complaint   Patient presents with    Gallbladder     ED, 2/28/25, Abdominal pain/gallbladder issues   .    Gallbladder  Associated symptoms include abdominal pain.       Past Medical History:    Anemia complicating pregnancy in second trimester (HCC)    Anxiety    Blindness of left eye    Calculus of kidney    Chlamydia    Chlamydia infection    Colon polyp    repeat CLN in 7 years    Congenital birth defect    casued optic damage    Decorative tattoo    Depression    History of adverse reaction to anesthesia    becomes \"aggitated\" when sedation initiated    History of suicidal ideation    hospitalized [facility]    History of suicidal ideation    hospitalized [facility]    Peptic ulcer    H. Pylori    Sexually transmitted disease     Past Surgical History:   Procedure Laterality Date    Adenoidectomy      Colonoscopy N/A 11/26/2024    Procedure: COLONOSCOPY;  Surgeon: ARMOND Khan MD;  Location: [facility] ENDOSCOPY    Create eardrum opening,gen anesth      D & c      Egd  11/26/2024    Procedure: EGD; Surgeon: ARMOND Khan MD; Location: [facility] ENDOSCOPY     Family History   Problem Relation Age of Onset    Cancer Mother     Ovarian Cancer Mother      Social History     Socioeconomic History    Marital status: Single     Spouse name: Not on file    Number of children: Not on file    Years of education: Not on file    Highest education level: Not on file   Occupational History    Not on file   Tobacco Use    Smoking status: Former    Smokeless tobacco: Never   Vaping Use    Vaping status: Never Used   Substance and Sexual Activity    Alcohol use: Not Currently    Drug use: Not Currently    Sexual activity: Not on file   Other Topics Concern    Not on file   Social History Narrative    Not on file     Social Drivers of Health     Food Insecurity: Not on File (9/26/2024)    Received from [employer]    Food Insecurity     Food: 0    Transportation Needs: Not on File (2023)    Received from [facility]    Transportation Needs     Transportation: 0   Stress: Not on File (2023)    Received from [facility]    Stress     Stress: 0   Housing Stability: Not on File (2023)    Received from [facility]    Housing Stability     Housin       Review of Systems   Constitutional: Negative.    HENT: Negative.     Eyes: Negative.    Respiratory: Negative.     Cardiovascular: Negative.    Gastrointestinal:  Positive for abdominal pain.   Endocrine: Negative.    Genitourinary: Negative.    Musculoskeletal: Negative.    Skin: Negative.    Allergic/Immunologic: Negative.    Neurological: Negative.    Hematological:  Does not bruise/bleed easily.   Psychiatric/Behavioral: Negative.             Current Outpatient Medications   Medication Sig Dispense Refill    HYDROcodone-acetaminophen 7.5-325 MG Oral Tab Take 1-2 tablets by mouth every 6 (six) hours as needed for Pain. 10 tablet 0    albuterol 108 (90 Base) MCG/ACT Inhalation Aero Soln Inhale 2 puffs into the lungs every 4 (four) hours as needed.      Phenyleph-Shark Fanny O-Glyc-Pet (PREPARATION H RE) Place rectally.      PEG 3350-KCl-Na Bicarb-NaCl (TRILYTE) 420 g Oral Recon Soln Take prep as directed by gastro office. May substitute with Trilyte/generic equivalent if needed. 4000 mL 0    Norgestimate-Eth Estradiol (SPRINTEC 28) 0.25-35 MG-MCG Oral Tab Take 1 tablet by mouth daily for 28 days. (Patient not taking: Reported on 10/9/2024) 28 tablet 0       Allergies:Allergies[1]   PHYSICAL EXAM:   BP (!) 137/99   Pulse 90   Ht 5' 6\" (1.676 m)   Wt 220 lb (99.8 kg)   LMP 2025 (Exact Date)   BMI 35.51 kg/m²   Physical Exam  Vitals reviewed.   Constitutional:       Appearance: Normal appearance. She is well-developed.   HENT:      Head: Normocephalic and atraumatic.   Cardiovascular:      Rate and Rhythm: Normal rate and regular rhythm.   Pulmonary:      Effort: Pulmonary effort is  normal.      Breath sounds: Normal breath sounds.   Abdominal:      General: There is no distension.      Palpations: Abdomen is soft. There is no mass.      Tenderness: There is no abdominal tenderness. There is no guarding or rebound.   Musculoskeletal:         General: Normal range of motion.      Cervical back: Normal range of motion and neck supple.   Skin:     General: Skin is warm and dry.   Neurological:      Mental Status: She is alert and oriented to person, place, and time.   Psychiatric:         Speech: Speech normal.         Behavior: Behavior normal.                 ASSESSMENT/PLAN:   Diagnoses and all orders for this visit:    Calculus of gallbladder without cholecystitis without obstruction  -     Surgical Case Request; Future      Plan for lap morgan in the near future.       Wily Whittaker MD  3/4/2025       [1]   Allergies  Allergen Reactions    Penicillins HIVES

## 2025-03-04 NOTE — H&P
HPI:    Patient ID: Tammi De La Garza is a 34 year old female presenting with   Chief Complaint   Patient presents with    Gallbladder     ED, 2/28/25, Abdominal pain/gallbladder issues   .    Gallbladder  Associated symptoms include abdominal pain.       Past Medical History:    Anemia complicating pregnancy in second trimester (HCC)    Anxiety    Blindness of left eye    Calculus of kidney    Chlamydia    Chlamydia infection    Colon polyp    repeat CLN in 7 years    Congenital birth defect    casued optic damage    Decorative tattoo    Depression    History of adverse reaction to anesthesia    becomes \"aggitated\" when sedation initiated    History of suicidal ideation    hospitalized [facility]    History of suicidal ideation    hospitalized [facility]    Peptic ulcer    H. Pylori    Sexually transmitted disease     Past Surgical History:   Procedure Laterality Date    Adenoidectomy      Colonoscopy N/A 11/26/2024    Procedure: COLONOSCOPY;  Surgeon: ARMOND Khan MD;  Location: [facility] ENDOSCOPY    Create eardrum opening,gen anesth      D & c      Egd  11/26/2024    Procedure: EGD; Surgeon: ARMOND Khan MD; Location: [facility] ENDOSCOPY     Family History   Problem Relation Age of Onset    Cancer Mother     Ovarian Cancer Mother      Social History     Socioeconomic History    Marital status: Single     Spouse name: Not on file    Number of children: Not on file    Years of education: Not on file    Highest education level: Not on file   Occupational History    Not on file   Tobacco Use    Smoking status: Former    Smokeless tobacco: Never   Vaping Use    Vaping status: Never Used   Substance and Sexual Activity    Alcohol use: Not Currently    Drug use: Not Currently    Sexual activity: Not on file   Other Topics Concern    Not on file   Social History Narrative    Not on file     Social Drivers of Health     Food Insecurity: Not on File (9/26/2024)    Received from [employer]    Food Insecurity     Food: 0    Transportation Needs: Not on File (2023)    Received from [facility]    Transportation Needs     Transportation: 0   Stress: Not on File (2023)    Received from [facility]    Stress     Stress: 0   Housing Stability: Not on File (2023)    Received from [facility]    Housing Stability     Housin       Review of Systems   Constitutional: Negative.    HENT: Negative.     Eyes: Negative.    Respiratory: Negative.     Cardiovascular: Negative.    Gastrointestinal:  Positive for abdominal pain.   Endocrine: Negative.    Genitourinary: Negative.    Musculoskeletal: Negative.    Skin: Negative.    Allergic/Immunologic: Negative.    Neurological: Negative.    Hematological:  Does not bruise/bleed easily.   Psychiatric/Behavioral: Negative.             Current Outpatient Medications   Medication Sig Dispense Refill    HYDROcodone-acetaminophen 7.5-325 MG Oral Tab Take 1-2 tablets by mouth every 6 (six) hours as needed for Pain. 10 tablet 0    albuterol 108 (90 Base) MCG/ACT Inhalation Aero Soln Inhale 2 puffs into the lungs every 4 (four) hours as needed.      Phenyleph-Shark Fanny O-Glyc-Pet (PREPARATION H RE) Place rectally.      PEG 3350-KCl-Na Bicarb-NaCl (TRILYTE) 420 g Oral Recon Soln Take prep as directed by gastro office. May substitute with Trilyte/generic equivalent if needed. 4000 mL 0    Norgestimate-Eth Estradiol (SPRINTEC 28) 0.25-35 MG-MCG Oral Tab Take 1 tablet by mouth daily for 28 days. (Patient not taking: Reported on 10/9/2024) 28 tablet 0       Allergies:Allergies[1]   PHYSICAL EXAM:   BP (!) 137/99   Pulse 90   Ht 5' 6\" (1.676 m)   Wt 220 lb (99.8 kg)   LMP 2025 (Exact Date)   BMI 35.51 kg/m²   Physical Exam  Vitals reviewed.   Constitutional:       Appearance: Normal appearance. She is well-developed.   HENT:      Head: Normocephalic and atraumatic.   Cardiovascular:      Rate and Rhythm: Normal rate and regular rhythm.   Pulmonary:      Effort: Pulmonary effort is  normal.      Breath sounds: Normal breath sounds.   Abdominal:      General: There is no distension.      Palpations: Abdomen is soft. There is no mass.      Tenderness: There is no abdominal tenderness. There is no guarding or rebound.   Musculoskeletal:         General: Normal range of motion.      Cervical back: Normal range of motion and neck supple.   Skin:     General: Skin is warm and dry.   Neurological:      Mental Status: She is alert and oriented to person, place, and time.   Psychiatric:         Speech: Speech normal.         Behavior: Behavior normal.                 ASSESSMENT/PLAN:   Diagnoses and all orders for this visit:    Calculus of gallbladder without cholecystitis without obstruction  -     Surgical Case Request; Future      Plan for lap morgan in the near future.       Wily Whittaker MD  3/4/2025       [1]   Allergies  Allergen Reactions    Penicillins HIVES

## 2025-03-05 ENCOUNTER — ANESTHESIA EVENT (OUTPATIENT)
Dept: SURGERY | Facility: HOSPITAL | Age: 35
End: 2025-03-05
Payer: MEDICAID

## 2025-03-05 ENCOUNTER — ANESTHESIA (OUTPATIENT)
Dept: SURGERY | Facility: HOSPITAL | Age: 35
End: 2025-03-05
Payer: MEDICAID

## 2025-03-05 ENCOUNTER — HOSPITAL ENCOUNTER (OUTPATIENT)
Facility: HOSPITAL | Age: 35
Setting detail: HOSPITAL OUTPATIENT SURGERY
Discharge: HOME | End: 2025-03-05
Attending: SURGERY | Admitting: SURGERY
Payer: MEDICAID

## 2025-03-05 VITALS
HEIGHT: 66 IN | RESPIRATION RATE: 16 BRPM | BODY MASS INDEX: 35.03 KG/M2 | HEART RATE: 60 BPM | OXYGEN SATURATION: 98 % | SYSTOLIC BLOOD PRESSURE: 101 MMHG | WEIGHT: 218 LBS | DIASTOLIC BLOOD PRESSURE: 60 MMHG | TEMPERATURE: 98 F

## 2025-03-05 DIAGNOSIS — K80.20 CALCULUS OF GALLBLADDER WITHOUT CHOLECYSTITIS WITHOUT OBSTRUCTION: ICD-10-CM

## 2025-03-05 LAB — B-HCG UR QL: NEGATIVE

## 2025-03-05 PROCEDURE — 47562 LAPAROSCOPIC CHOLECYSTECTOMY: CPT | Performed by: SURGERY

## 2025-03-05 PROCEDURE — 0FT44ZZ RESECTION OF GALLBLADDER, PERCUTANEOUS ENDOSCOPIC APPROACH: ICD-10-PCS | Performed by: SURGERY

## 2025-03-05 RX ORDER — HYDROMORPHONE HYDROCHLORIDE 1 MG/ML
0.4 INJECTION, SOLUTION INTRAMUSCULAR; INTRAVENOUS; SUBCUTANEOUS EVERY 5 MIN PRN
Status: DISCONTINUED | OUTPATIENT
Start: 2025-03-05 | End: 2025-03-05

## 2025-03-05 RX ORDER — MIDAZOLAM HYDROCHLORIDE 1 MG/ML
INJECTION INTRAMUSCULAR; INTRAVENOUS AS NEEDED
Status: DISCONTINUED | OUTPATIENT
Start: 2025-03-05 | End: 2025-03-05 | Stop reason: SURG

## 2025-03-05 RX ORDER — ONDANSETRON 2 MG/ML
INJECTION INTRAMUSCULAR; INTRAVENOUS AS NEEDED
Status: DISCONTINUED | OUTPATIENT
Start: 2025-03-05 | End: 2025-03-05 | Stop reason: SURG

## 2025-03-05 RX ORDER — HYDROCODONE BITARTRATE AND ACETAMINOPHEN 5; 325 MG/1; MG/1
1-2 TABLET ORAL EVERY 6 HOURS PRN
Qty: 15 TABLET | Refills: 0 | Status: SHIPPED | OUTPATIENT
Start: 2025-03-05

## 2025-03-05 RX ORDER — MORPHINE SULFATE 10 MG/ML
6 INJECTION, SOLUTION INTRAMUSCULAR; INTRAVENOUS EVERY 10 MIN PRN
Status: DISCONTINUED | OUTPATIENT
Start: 2025-03-05 | End: 2025-03-05

## 2025-03-05 RX ORDER — SODIUM CHLORIDE, SODIUM LACTATE, POTASSIUM CHLORIDE, CALCIUM CHLORIDE 600; 310; 30; 20 MG/100ML; MG/100ML; MG/100ML; MG/100ML
INJECTION, SOLUTION INTRAVENOUS CONTINUOUS
Status: DISCONTINUED | OUTPATIENT
Start: 2025-03-05 | End: 2025-03-05

## 2025-03-05 RX ORDER — KETOROLAC TROMETHAMINE 30 MG/ML
INJECTION, SOLUTION INTRAMUSCULAR; INTRAVENOUS AS NEEDED
Status: DISCONTINUED | OUTPATIENT
Start: 2025-03-05 | End: 2025-03-05 | Stop reason: SURG

## 2025-03-05 RX ORDER — NALOXONE HYDROCHLORIDE 0.4 MG/ML
80 INJECTION, SOLUTION INTRAMUSCULAR; INTRAVENOUS; SUBCUTANEOUS AS NEEDED
Status: DISCONTINUED | OUTPATIENT
Start: 2025-03-05 | End: 2025-03-05

## 2025-03-05 RX ORDER — ROCURONIUM BROMIDE 10 MG/ML
INJECTION, SOLUTION INTRAVENOUS AS NEEDED
Status: DISCONTINUED | OUTPATIENT
Start: 2025-03-05 | End: 2025-03-05 | Stop reason: SURG

## 2025-03-05 RX ORDER — FAMOTIDINE 20 MG/1
20 TABLET, FILM COATED ORAL ONCE
Status: COMPLETED | OUTPATIENT
Start: 2025-03-05 | End: 2025-03-05

## 2025-03-05 RX ORDER — PROCHLORPERAZINE EDISYLATE 5 MG/ML
5 INJECTION INTRAMUSCULAR; INTRAVENOUS EVERY 8 HOURS PRN
Status: DISCONTINUED | OUTPATIENT
Start: 2025-03-05 | End: 2025-03-05

## 2025-03-05 RX ORDER — HYDROCODONE BITARTRATE AND ACETAMINOPHEN 5; 325 MG/1; MG/1
1 TABLET ORAL ONCE
Status: COMPLETED | OUTPATIENT
Start: 2025-03-05 | End: 2025-03-05

## 2025-03-05 RX ORDER — MORPHINE SULFATE 4 MG/ML
4 INJECTION, SOLUTION INTRAMUSCULAR; INTRAVENOUS EVERY 10 MIN PRN
Status: DISCONTINUED | OUTPATIENT
Start: 2025-03-05 | End: 2025-03-05

## 2025-03-05 RX ORDER — POLYETHYLENE GLYCOL 3350 17 G/17G
17 POWDER, FOR SOLUTION ORAL DAILY PRN
Qty: 30 EACH | Refills: 0 | Status: SHIPPED | OUTPATIENT
Start: 2025-03-05

## 2025-03-05 RX ORDER — BUPIVACAINE HYDROCHLORIDE 5 MG/ML
INJECTION, SOLUTION EPIDURAL; INTRACAUDAL AS NEEDED
Status: DISCONTINUED | OUTPATIENT
Start: 2025-03-05 | End: 2025-03-05 | Stop reason: HOSPADM

## 2025-03-05 RX ORDER — FAMOTIDINE 10 MG/ML
20 INJECTION, SOLUTION INTRAVENOUS ONCE
Status: COMPLETED | OUTPATIENT
Start: 2025-03-05 | End: 2025-03-05

## 2025-03-05 RX ORDER — ONDANSETRON 4 MG/1
4 TABLET, FILM COATED ORAL EVERY 8 HOURS PRN
Qty: 15 TABLET | Refills: 0 | Status: SHIPPED | OUTPATIENT
Start: 2025-03-05

## 2025-03-05 RX ORDER — ONDANSETRON 2 MG/ML
4 INJECTION INTRAMUSCULAR; INTRAVENOUS EVERY 6 HOURS PRN
Status: DISCONTINUED | OUTPATIENT
Start: 2025-03-05 | End: 2025-03-05

## 2025-03-05 RX ORDER — DEXAMETHASONE SODIUM PHOSPHATE 4 MG/ML
VIAL (ML) INJECTION AS NEEDED
Status: DISCONTINUED | OUTPATIENT
Start: 2025-03-05 | End: 2025-03-05 | Stop reason: SURG

## 2025-03-05 RX ORDER — LIDOCAINE HYDROCHLORIDE 10 MG/ML
INJECTION, SOLUTION EPIDURAL; INFILTRATION; INTRACAUDAL; PERINEURAL AS NEEDED
Status: DISCONTINUED | OUTPATIENT
Start: 2025-03-05 | End: 2025-03-05 | Stop reason: SURG

## 2025-03-05 RX ORDER — ACETAMINOPHEN 500 MG
1000 TABLET ORAL ONCE
Status: COMPLETED | OUTPATIENT
Start: 2025-03-05 | End: 2025-03-05

## 2025-03-05 RX ORDER — HYDROMORPHONE HYDROCHLORIDE 1 MG/ML
0.6 INJECTION, SOLUTION INTRAMUSCULAR; INTRAVENOUS; SUBCUTANEOUS EVERY 5 MIN PRN
Status: DISCONTINUED | OUTPATIENT
Start: 2025-03-05 | End: 2025-03-05

## 2025-03-05 RX ORDER — METOCLOPRAMIDE HYDROCHLORIDE 5 MG/ML
10 INJECTION INTRAMUSCULAR; INTRAVENOUS ONCE
Status: COMPLETED | OUTPATIENT
Start: 2025-03-05 | End: 2025-03-05

## 2025-03-05 RX ORDER — HYDROMORPHONE HYDROCHLORIDE 1 MG/ML
0.2 INJECTION, SOLUTION INTRAMUSCULAR; INTRAVENOUS; SUBCUTANEOUS EVERY 5 MIN PRN
Status: DISCONTINUED | OUTPATIENT
Start: 2025-03-05 | End: 2025-03-05

## 2025-03-05 RX ORDER — METOCLOPRAMIDE 10 MG/1
10 TABLET ORAL ONCE
Status: COMPLETED | OUTPATIENT
Start: 2025-03-05 | End: 2025-03-05

## 2025-03-05 RX ORDER — MORPHINE SULFATE 4 MG/ML
2 INJECTION, SOLUTION INTRAMUSCULAR; INTRAVENOUS EVERY 10 MIN PRN
Status: DISCONTINUED | OUTPATIENT
Start: 2025-03-05 | End: 2025-03-05

## 2025-03-05 RX ADMIN — LIDOCAINE HYDROCHLORIDE 50 MG: 10 INJECTION, SOLUTION EPIDURAL; INFILTRATION; INTRACAUDAL; PERINEURAL at 10:12:00

## 2025-03-05 RX ADMIN — DEXAMETHASONE SODIUM PHOSPHATE 4 MG: 4 MG/ML VIAL (ML) INJECTION at 10:21:00

## 2025-03-05 RX ADMIN — KETOROLAC TROMETHAMINE 30 MG: 30 INJECTION, SOLUTION INTRAMUSCULAR; INTRAVENOUS at 11:08:00

## 2025-03-05 RX ADMIN — MIDAZOLAM HYDROCHLORIDE 2 MG: 1 INJECTION INTRAMUSCULAR; INTRAVENOUS at 10:08:00

## 2025-03-05 RX ADMIN — SODIUM CHLORIDE, SODIUM LACTATE, POTASSIUM CHLORIDE, CALCIUM CHLORIDE: 600; 310; 30; 20 INJECTION, SOLUTION INTRAVENOUS at 10:08:00

## 2025-03-05 RX ADMIN — SODIUM CHLORIDE, SODIUM LACTATE, POTASSIUM CHLORIDE, CALCIUM CHLORIDE: 600; 310; 30; 20 INJECTION, SOLUTION INTRAVENOUS at 11:21:00

## 2025-03-05 RX ADMIN — ONDANSETRON 4 MG: 2 INJECTION INTRAMUSCULAR; INTRAVENOUS at 10:21:00

## 2025-03-05 RX ADMIN — ROCURONIUM BROMIDE 50 MG: 10 INJECTION, SOLUTION INTRAVENOUS at 10:13:00

## 2025-03-05 NOTE — OPERATIVE REPORT
[facility]  part of [facility]     Operative Report    Patient Name:  Tammi De La Garza  MR:  [id number]  :  3/11/1990  DOS:  25    Preop Dx:  Calculus of gallbladder without cholecystitis without obstruction [K80.20]  Postop Dx:  Calculus of gallbladder without cholecystitis without obstruction [K80.20]  Procedure:  Laparoscopic cholecystectomy  Surgeon:  Wily Whittaker MD  Surgical Assistant.: Link Dey CSA  PA: Li Page PA-C, Maxwell Dominguez MD  EBL: 5 ml  Complication:  None    INDICATION:  Pt is a 34 year old female who with Calculus of gallbladder without cholecystitis without obstruction [K80.20] who is scheduled for a laparoscopic cholecystectomy.    CONSENT:  An informed consent discussion was held with the patient regarding the nature of Calculus of gallbladder without cholecystitis without obstruction [K80.20], the treatment options and the details of the procedure.  The risks including but not limited to bleeding, wound infection, intra-abdominal infection, injury to the liver, colon, small intestine, pancreas, stomach, common bile duct, incomplete resection, cystic duct stump leak, retained stone and incisional hernia were discussed.  The patient expressed understanding and want to proceed with the planned procedure.    TECHNIQUE:  The patient was taken to the OR and placed in supine position.  General anesthesia was established and the abdomen was prepped in standard fashion.  Pneumoperitoneum was obtained using open technique through a supra-umbilical incision.  A 12-mm trocar was inserted under direct visualization and no injury occurred.  Examination of the abdomen showed a thickened and fibrotic appearing gallbladder consistent with Calculus of gallbladder without cholecystitis without obstruction [K80.20].  Three 5-mm trocars were placed in the epigastric and right abdomen.  The patient was placed in reverse Trendelenburg position.  The fundus was  grasped and retracted cephalad.  The infundibulum was grasped and retracted inferior, anterior, and to the right.  The peritoneum along the medial and lateral aspect of the gallbladder/liver edge were incised using hook cautery.  The lower 1/3 of the gallbladder was dissected from the liver.  Two structures, identified as the cystic duct and artery, are seen entering the infundibulum, thus obtaining the so called \"critical view of safety\".  The cystic duct and artery were clipped and divided.  The gallbladder was detached from the liver using hook cautery and delivered from the abdomen using an endocatch bag.  The abdominal cavity was irrigated with saline and found to be hemostatic.  The trocars were removed under direct visualization and no port site bleeding was seen.  The supra-umbilical fascia was closed using 0 vicryl.  The skin incisions were closed using 4-0 vicryl.  Sterile dressings were applied.  All instrument and sponge counts were correct.  I was present during the critical portions of the procedure.    Wily Whittaker MD

## 2025-03-05 NOTE — ANESTHESIA POSTPROCEDURE EVALUATION
Patient: Tammi De La Garza    Procedure Summary       Date: 03/05/25 Room / Location: [facility] MAIN OR 04 / [facility] MAIN OR    Anesthesia Start: 1008 Anesthesia Stop:     Procedure: laparoscopic cholecystectomy (Abdomen) Diagnosis:       Calculus of gallbladder without cholecystitis without obstruction      (Calculus of gallbladder without cholecystitis without obstruction [K80.20])    Surgeons: Wily Whittaker MD Anesthesiologist: Mely Myles MD    Anesthesia Type: general ASA Status: 2            Anesthesia Type: general    Vitals Value Taken Time   /115 03/05/25 1121   Temp 98.8 °F (37.1 °C) 03/05/25 1121   Pulse 71 03/05/25 1121   Resp 19 03/05/25 1121   SpO2 99 % 03/05/25 1121   Vitals shown include unfiled device data.    [facility] AN Post Evaluation:   Patient Evaluated in PACU  Patient Participation: complete - patient participated  Level of Consciousness: sleepy but conscious  Pain Management: adequate  Airway Patency:patent  Yes    Nausea/Vomiting: none  Cardiovascular Status: acceptable  Respiratory Status: acceptable and nasal cannula  Postoperative Hydration acceptable      Bianka Gomes CRNA  3/5/2025 11:22 AM

## 2025-03-05 NOTE — INTERVAL H&P NOTE
Pre-op Diagnosis: Calculus of gallbladder without cholecystitis without obstruction [K80.20]    The above referenced H&P was reviewed by Wily Whittaker MD on 3/5/2025, the patient was examined and no significant changes have occurred in the patient's condition since the H&P was performed.  I discussed with the patient and/or legal representative the potential benefits, risks and side effects of this procedure; the likelihood of the patient achieving goals; and potential problems that might occur during recuperation.  I discussed reasonable alternatives to the procedure, including risks, benefits and side effects related to the alternatives and risks related to not receiving this procedure.  We will proceed with procedure as planned.

## 2025-03-05 NOTE — DISCHARGE INSTRUCTIONS
Home Care Instructions  LAPAROSCOPIC CHOLECYSTECTOMY      1. You have incisions with absorbable sutures underneath the skin so no suture removal are needed.      2. You can shower the day after surgery.  The incisions can get wet with water and soap.  Just pat your incisions dry after showering.  Avoid soaking in a bath tub for one week.  Avoid heavy lifting (greater than 10 lbs or anything heavier than a gallon of milk) for six weeks after surgery.    3. Be up and around when you are home.  The more you walk, the faster your recovery will be.    4. You may have an abdominal binder (medical girdle).  Wear it when you are up and moving around.  The bottom of the binder should cover a little of your hip bones to provide support to your lower abdomen.  Avoid wearing the binder too high as it may make your discomfort worse.    5. Take pain medications around the clock for the first few days after surgery regardless if you have pain or not.  The pain medications take about 30 minutes to work so if you wait until you experience pain, then you might be uncomfortable during those 30 minutes.    6.  A well known side effect of pain medication is constipation.  It is the number 1, 2, and 3 reason why patients call me after surgery.  Adequate hydration and stool softeners are ways to minimize the risk of constipation after surgery.  Drink a lot of fluid when you are at home.  Check your urine color.  If it's dark, then you are dehydrated and need to drink more water.  Take as many stool softeners (morning, noon, evening) as you need to have about one bowel movement a day.  Don't let four or five days go by without a bowel movement.  If that occurs, then you might need a rectal suppository or an enema to treat the constipation.    7.  You may drive when you are no longer taking prescription pain medications with narcotics.  If your degree of discomfort is minimal, extra strength tylenol alternating with ibuprofen could be used  as necessary.    8.  Please contact the office ([phone]) to schedule a telephone visit approximately two weeks after surgery.  At that time, if there are any issues, then we will schedule an in person clinic visit.    9. Signs and symptoms of post-operative problems include abdominal pain associated with nausea and/or vomiting, fever, chills, excessive drainage or pain at the incision sites, leg swelling/pain, or chest pain. Contact our office immediately if these signs or symptoms occur.    10. It is not unusual for you to experience pain similar to the “gallbladder attacks” that you had pre-operatively. Diarrhea can also occur, as well as persistent food intolerance. These symptoms are usually self limiting and will resolve on their own in several weeks.    11. If you have any problems or questions please contact me at any time day or night.  My cell phone number is [phone].         HOME INSTRUCTIONS  [facility] HOME CARE INSTRUCTIONS: POST-OP ANESTHESIA  The medication that you received for sedation or general anesthesia can last up to 24 hours. Your judgment and reflexes may be altered, even if you feel like your normal self.      We Recommend:   Do not drive any motor vehicle or bicycle   Avoid mowing the lawn, playing sports, or working with power tools/applicances (power saws, electric knives or mixers)   That you have someone stay with you on your first night home   Do not drink alcohol or take sleeping pills or tranquilizers   Do not sign legal documents within 24 hours of your procedure   If you had a nerve block for your surgery, take extra care not to put any pressure on your arm or hand for 24 hours    It is normal:  For you to have a sore throat if you had a breathing tube during surgery (while you were asleep!). The sore throat should get better within 48 hours. You can gargle with warm salt water (1/2 tsp in 4 oz warm water) or use a throat lozenge for comfort  To feel muscle aches or soreness  especially in the abdomen, chest or neck. The achy feeling should go away in the next 24 hours  To feel weak, sleepy or \"wiped out\". Your should start feeling better in the next 24 hours.   To experience mild discomforts such as sore lip or tongue, headache, cramps, gas pains or a bloated feeling in your abdomen.   To experience mild back pain or soreness for a day or two if you had spinal or epidural anesthesia.   If you had laparoscopic surgery, to feel shoulder pain or discomfort on the day of surgery.   For some patients to have nausea after surgery/anesthesia    If you feel nausea or experience vomiting:   Try to move around less.   Eat less than usual or drink only liquids until the next morning   Nausea should resolve in about 24 hours    If you have a problem when you are at home:    Call your surgeons office   Discharge Instructions: After Your Surgery  You’ve just had surgery. During surgery, you were given medicine called anesthesia to keep you relaxed and free of pain. After surgery, you may have some pain or nausea. This is common. Here are some tips for feeling better and getting well after surgery.   Going home  Your healthcare provider will show you how to take care of yourself when you go home. They'll also answer your questions. Have an adult family member or friend drive you home. For the first 24 hours after your surgery:   Don't drive or use heavy equipment.  Don't make important decisions or sign legal papers.  Take medicines as directed.  Don't drink alcohol.  Have someone stay with you, if needed. They can watch for problems and help keep you safe.  Be sure to go to all follow-up visits with your healthcare provider. And rest after your surgery for as long as your provider tells you to.   Coping with pain  If you have pain after surgery, pain medicine will help you feel better. Take it as directed, before pain becomes severe. Also, ask your healthcare provider or pharmacist about other ways  to control pain. This might be with heat, ice, or relaxation. And follow any other instructions your surgeon or nurse gives you.      Stay on schedule with your medicine.     Tips for taking pain medicine  To get the best relief possible, remember these points:   Pain medicines can upset your stomach. Taking them with a little food may help.  Most pain relievers taken by mouth need at least 20 to 30 minutes to start to work.  Don't wait till your pain becomes severe before you take your medicine. Try to time your medicine so that you can take it before starting an activity. This might be before you get dressed, go for a walk, or sit down for dinner.  Constipation is a common side effect of some pain medicines. Call your healthcare provider before taking any medicines such as laxatives or stool softeners to help ease constipation. Also ask if you should skip any foods. Drinking lots of fluids and eating foods such as fruits and vegetables that are high in fiber can also help. Remember, don't take laxatives unless your surgeon has prescribed them.  Drinking alcohol and taking pain medicine can cause dizziness and slow your breathing. It can even be deadly. Don't drink alcohol while taking pain medicine.  Pain medicine can make you react more slowly to things. Don't drive or run machinery while taking pain medicine.  Your healthcare provider may tell you to take acetaminophen to help ease your pain. Ask them how much you're supposed to take each day. Acetaminophen or other pain relievers may interact with your prescription medicines or other over-the-counter (OTC) medicines. Some prescription medicines have acetaminophen and other ingredients in them. Using both prescription and OTC acetaminophen for pain can cause you to accidentally overdose. Read the labels on your OTC medicines with care. This will help you to clearly know the list of ingredients, how much to take, and any warnings. It may also help you not take  too much acetaminophen. If you have questions or don't understand the information, ask your pharmacist or healthcare provider to explain it to you before you take the OTC medicine.   Managing nausea  Some people have an upset stomach (nausea) after surgery. This is often because of anesthesia, pain, or pain medicine, less movement of food in the stomach, or the stress of surgery. These tips will help you handle nausea and eat healthy foods as you get better. If you were on a special food plan before surgery, ask your healthcare provider if you should follow it while you get better. Check with your provider on how your eating should progress. It may depend on the surgery you had. These general tips may help:   Don't push yourself to eat. Your body will tell you when to eat and how much.  Start off with clear liquids and soup. They're easier to digest.  Next try semi-solid foods as you feel ready. These include mashed potatoes, applesauce, and gelatin.  Slowly move to solid foods. Don’t eat fatty, rich, or spicy foods at first.  Don't force yourself to have 3 large meals a day. Instead eat smaller amounts more often.  Take pain medicines with a small amount of solid food, such as crackers or toast. This helps prevent nausea.  When to call your healthcare provider  Call your healthcare provider right away if you have any of these:   You still have too much pain, or the pain gets worse, after taking the medicine. The medicine may not be strong enough. Or there may be a complication from the surgery.  You feel too sleepy, dizzy, or groggy. The medicine may be too strong.  Side effects such as nausea or vomiting. Your healthcare provider may advise taking other medicines to .  Skin changes such as rash, itching, or hives. This may mean you have an allergic reaction. Your provider may advise taking other medicines.  The incision looks different (for instance, part of it opens up).  Bleeding or fluid leaking from the  incision site, and weren't told to expect that.  Fever of 100.4°F (38°C) or higher, or as directed by your provider.  Call 911  Call 911 right away if you have:   Trouble breathing  Facial swelling    If you have obstructive sleep apnea   You were given anesthesia medicine during surgery to keep you comfortable and free of pain. After surgery, you may have more apnea spells because of this medicine and other medicines you were given. The spells may last longer than normal.    At home:  Keep using the continuous positive airway pressure (CPAP) device when you sleep. Unless your healthcare provider tells you not to, use it when you sleep, day or night. CPAP is a common device used to treat obstructive sleep apnea.  Talk with your provider before taking any pain medicine, muscle relaxants, or sedatives. Your provider will tell you about the possible dangers of taking these medicines.  Contact your provider if your sleeping changes a lot even when taking medicines as directed.  Sajan last reviewed this educational content on 10/1/2021  © 3740-0798 [employer]. All rights reserved. This information is not intended as a substitute for professional medical care. Always follow your healthcare professional's instructions.

## 2025-03-05 NOTE — ANESTHESIA PREPROCEDURE EVALUATION
Anesthesia PreOp Note    HPI:     Tammi De La Garza is a 34 year old female who presents for preoperative consultation requested by: Wily Whittaker MD    Date of Surgery: 3/5/2025    Procedure(s):  laparoscopic cholecystectomy  Indication: Calculus of gallbladder without cholecystitis without obstruction [K80.20]    Relevant Problems   No relevant active problems       NPO:  Last Liquid Consumption Date: 03/04/25  Last Liquid Consumption Time: 2357  Last Solid Consumption Date: 03/04/25  Last Solid Consumption Time: 1930  Last Liquid Consumption Date: 03/04/25          History Review:  Patient Active Problem List    Diagnosis Date Noted    Calculus of gallbladder without cholecystitis without obstruction 03/04/2025    Helicobacter pylori (H. pylori) infection 11/26/2024    Hiatal hernia 11/26/2024    Internal hemorrhoids 11/26/2024    Polyp of descending colon 11/26/2024    Pregnancy ([facility]) 01/02/2023    Vaginal delivery ([facility])     Proteinuria affecting pregnancy, antepartum ([facility])     Anemia complicating pregnancy in second trimester ([facility]) 12/07/2022    PTSD (post-traumatic stress disorder) 07/18/2022    Peptic ulcer     Depression     Anxiety     Blindness of left eye 2017    Congenital birth defect 1990       Past Medical History:    Anemia complicating pregnancy in second trimester ([facility])    Anxiety    Back problem    Blindness of left eye    Calculus of kidney    Chlamydia    Chlamydia infection    Colon polyp    repeat CLN in 7 years    Congenital birth defect    casued optic damage    Decorative tattoo    Depression    History of adverse reaction to anesthesia    becomes \"aggitated\" when sedation initiated    History of suicidal ideation    hospitalized [facility]    History of suicidal ideation    hospitalized Elijah Linn    Peptic ulcer    H. Pylori    Sexually transmitted disease       Past Surgical History:   Procedure Laterality Date    Adenoidectomy      Colonoscopy N/A 11/26/2024    Procedure: COLONOSCOPY;   Surgeon: ARMOND Khan MD;  Location: [facility] ENDOSCOPY    Create eardrum opening,gen anesth      D & c      Egd  11/26/2024    Procedure: EGD; Surgeon: ARMOND Khan MD; Location: [facility] ENDOSCOPY    Eye surgery Bilateral        Prescriptions Prior to Admission[1]  Current Medications and Prescriptions Ordered in Epic[2]    Allergies[3]    Family History   Problem Relation Age of Onset    Cancer Mother     Ovarian Cancer Mother      Social History     Socioeconomic History    Marital status: Single   Tobacco Use    Smoking status: Former    Smokeless tobacco: Never   Vaping Use    Vaping status: Never Used   Substance and Sexual Activity    Alcohol use: Not Currently    Drug use: Not Currently       Available pre-op labs reviewed.  Lab Results   Component Value Date    WBC 11.2 (H) 02/28/2025    RBC 4.97 02/28/2025    HGB 15.7 02/28/2025    HCT 46.4 02/28/2025    MCV 93.4 02/28/2025    MCH 31.6 02/28/2025    MCHC 33.8 02/28/2025    RDW 13.2 02/28/2025    .0 02/28/2025    PREGS Negative 02/28/2025    URINEPREG Negative 03/05/2025     Lab Results   Component Value Date     02/28/2025    K 3.9 02/28/2025     02/28/2025    CO2 23.0 02/28/2025    BUN 10 02/28/2025    CREATSERUM 0.85 02/28/2025     (H) 02/28/2025    CA 9.4 02/28/2025          Vital Signs:  Body mass index is 35.19 kg/m².   height is 1.676 m (5' 6\") and weight is 98.9 kg (218 lb). Her oral temperature is 97.7 °F (36.5 °C). Her blood pressure is 103/67 and her pulse is 97. Her respiration is 18 and oxygen saturation is 97%.   Vitals:    03/04/25 1544 03/05/25 0825   BP:  103/67   Pulse:  97   Resp:  18   Temp:  97.7 °F (36.5 °C)   TempSrc:  Oral   SpO2:  97%   Weight: 99.8 kg (220 lb) 98.9 kg (218 lb)   Height: 1.676 m (5' 6\") 1.676 m (5' 6\")        Anesthesia Evaluation      Airway   Dental      Pulmonary - negative ROS   Cardiovascular     Neuro/Psych - negative ROS   (+)  anxiety/panic attacks,  depression      GI/Hepatic/Renal     (+) PUD    Endo/Other - negative ROS   Abdominal                  Anesthesia Plan:   ASA:  2  Plan:   General  Airway:  ETT  Post-op Pain Management: Oral pain medication and IV analgesics  Informed Consent Plan and Risks Discussed With:  Patient  Discussed plan with:  CRNA      I have informed Tammi De La Garza and/or legal guardian or family member of the nature of the anesthetic plan, benefits, risks including possible dental damage if relevant, major complications, and any alternative forms of anesthetic management.   All of the patient's questions were answered to the best of my ability. The patient desires the anesthetic management as planned.  Mely Myles MD  3/5/2025 9:50 AM  Present on Admission:  **None**           [1]   Medications Prior to Admission   Medication Sig Dispense Refill Last Dose/Taking    HYDROcodone-acetaminophen 7.5-325 MG Oral Tab Take 1-2 tablets by mouth every 6 (six) hours as needed for Pain. 10 tablet 0 3/3/2025    albuterol 108 (90 Base) MCG/ACT Inhalation Aero Soln Inhale 2 puffs into the lungs every 4 (four) hours as needed.   More than a month    Phenyleph-Shark Fanny O-Glyc-Pet (PREPARATION H RE) Place rectally.   More than a month   [2]   Current Facility-Administered Medications Ordered in Epic   Medication Dose Route Frequency Provider Last Rate Last Admin    lactated ringers infusion   Intravenous Continuous Wily Whittaker MD 20 mL/hr at 03/05/25 0900 New Bag at 03/05/25 0900    ceFAZolin (Ancef) 2g in 10mL IV syringe premix  2 g Intravenous Once Wily Whittaker MD         No current [facility]-ordered outpatient medications on file.   [3]   Allergies  Allergen Reactions    Penicillins HIVES     No organ damage  No skin blistering or sloughing

## 2025-03-05 NOTE — ANESTHESIA PROCEDURE NOTES
Airway  Date/Time: 3/5/2025 10:15 AM  Urgency: Elective    Airway not difficult    General Information and Staff    Patient location during procedure: OR  Anesthesiologist: Mely Myles MD  Resident/CRNA: Bianka Gomes CRNA  Performed: CRNA   Performed by: Bianka Gomes CRNA  Authorized by: Mely Myles MD      Indications and Patient Condition  Indications for airway management: anesthesia  Spontaneous ventilation: present  Sedation level: deep  Preoxygenated: yes  Patient position: sniffing  Mask difficulty assessment: 1 - vent by mask    Final Airway Details  Final airway type: endotracheal airway      Successful airway: ETT  Cuffed: yes   Successful intubation technique: direct laryngoscopy  Facilitating devices/methods: intubating stylet  Endotracheal tube insertion site: oral  Blade: Serjio  Blade size: #4  ETT size (mm): 7.0    Cormack-Lehane Classification: grade I - full view of glottis  Placement verified by: capnometry   Measured from: lips  ETT to lips (cm): 22  Number of attempts at approach: 1

## 2025-03-21 ENCOUNTER — NURSE ONLY (OUTPATIENT)
Dept: SURGERY | Facility: CLINIC | Age: 35
End: 2025-03-21
Payer: MEDICAID

## 2025-03-21 ENCOUNTER — TELEPHONE (OUTPATIENT)
Dept: SURGERY | Facility: CLINIC | Age: 35
End: 2025-03-21

## 2025-03-21 ENCOUNTER — LAB ENCOUNTER (OUTPATIENT)
Dept: LAB | Facility: HOSPITAL | Age: 35
End: 2025-03-21
Payer: MEDICAID

## 2025-03-21 ENCOUNTER — HOSPITAL ENCOUNTER (OUTPATIENT)
Dept: CT IMAGING | Facility: HOSPITAL | Age: 35
Discharge: HOME | End: 2025-03-21
Payer: MEDICAID

## 2025-03-21 VITALS
SYSTOLIC BLOOD PRESSURE: 109 MMHG | HEART RATE: 99 BPM | DIASTOLIC BLOOD PRESSURE: 62 MMHG | BODY MASS INDEX: 35 KG/M2 | WEIGHT: 218 LBS

## 2025-03-21 DIAGNOSIS — R06.02 SHORTNESS OF BREATH: ICD-10-CM

## 2025-03-21 DIAGNOSIS — R10.33 PERIUMBILICAL ABDOMINAL PAIN: ICD-10-CM

## 2025-03-21 DIAGNOSIS — R10.33 PERIUMBILICAL ABDOMINAL PAIN: Primary | ICD-10-CM

## 2025-03-21 DIAGNOSIS — R30.0 DYSURIA: ICD-10-CM

## 2025-03-21 DIAGNOSIS — N30.00 ACUTE CYSTITIS WITHOUT HEMATURIA: Primary | ICD-10-CM

## 2025-03-21 DIAGNOSIS — R10.11 RUQ ABDOMINAL PAIN: ICD-10-CM

## 2025-03-21 LAB
ALBUMIN SERPL-MCNC: 4.6 G/DL (ref 3.2–4.8)
ALBUMIN/GLOB SERPL: 1.4 {RATIO} (ref 1–2)
ALP LIVER SERPL-CCNC: 125 U/L
ALT SERPL-CCNC: 57 U/L
ANION GAP SERPL CALC-SCNC: 8 MMOL/L (ref 0–18)
AST SERPL-CCNC: 41 U/L (ref ?–34)
BASOPHILS # BLD AUTO: 0.06 X10(3) UL (ref 0–0.2)
BASOPHILS NFR BLD AUTO: 0.6 %
BILIRUB SERPL-MCNC: 0.4 MG/DL (ref 0.3–1.2)
BILIRUB UR QL: NEGATIVE
BUN BLD-MCNC: 8 MG/DL (ref 9–23)
BUN/CREAT SERPL: 10.1 (ref 10–20)
CALCIUM BLD-MCNC: 9.3 MG/DL (ref 8.7–10.4)
CHLORIDE SERPL-SCNC: 105 MMOL/L (ref 98–112)
CO2 SERPL-SCNC: 25 MMOL/L (ref 21–32)
COLOR UR: YELLOW
CREAT BLD-MCNC: 0.79 MG/DL
DEPRECATED RDW RBC AUTO: 45 FL (ref 35.1–46.3)
EGFRCR SERPLBLD CKD-EPI 2021: 100 ML/MIN/1.73M2 (ref 60–?)
EOSINOPHIL # BLD AUTO: 0.35 X10(3) UL (ref 0–0.7)
EOSINOPHIL NFR BLD AUTO: 3.6 %
ERYTHROCYTE [DISTWIDTH] IN BLOOD BY AUTOMATED COUNT: 12.9 % (ref 11–15)
FASTING STATUS PATIENT QL REPORTED: NO
GLOBULIN PLAS-MCNC: 3.2 G/DL (ref 2–3.5)
GLUCOSE BLD-MCNC: 106 MG/DL (ref 70–99)
GLUCOSE UR-MCNC: NORMAL MG/DL
HCT VFR BLD AUTO: 43.5 %
HGB BLD-MCNC: 15.2 G/DL
HGB UR QL STRIP.AUTO: NEGATIVE
IMM GRANULOCYTES # BLD AUTO: 0.02 X10(3) UL (ref 0–1)
IMM GRANULOCYTES NFR BLD: 0.2 %
KETONES UR-MCNC: NEGATIVE MG/DL
LEUKOCYTE ESTERASE UR QL STRIP.AUTO: 250
LYMPHOCYTES # BLD AUTO: 3.27 X10(3) UL (ref 1–4)
LYMPHOCYTES NFR BLD AUTO: 34.1 %
MCH RBC QN AUTO: 33.3 PG (ref 26–34)
MCHC RBC AUTO-ENTMCNC: 34.9 G/DL (ref 31–37)
MCV RBC AUTO: 95.2 FL
MONOCYTES # BLD AUTO: 0.59 X10(3) UL (ref 0.1–1)
MONOCYTES NFR BLD AUTO: 6.1 %
NEUTROPHILS # BLD AUTO: 5.31 X10 (3) UL (ref 1.5–7.7)
NEUTROPHILS # BLD AUTO: 5.31 X10(3) UL (ref 1.5–7.7)
NEUTROPHILS NFR BLD AUTO: 55.4 %
NITRITE UR QL STRIP.AUTO: NEGATIVE
OSMOLALITY SERPL CALC.SUM OF ELEC: 285 MOSM/KG (ref 275–295)
PH UR: 6 [PH] (ref 5–8)
PLATELET # BLD AUTO: 350 10(3)UL (ref 150–450)
POTASSIUM SERPL-SCNC: 4.7 MMOL/L (ref 3.5–5.1)
PROT SERPL-MCNC: 7.8 G/DL (ref 5.7–8.2)
PROT UR-MCNC: 20 MG/DL
RBC # BLD AUTO: 4.57 X10(6)UL
SODIUM SERPL-SCNC: 138 MMOL/L (ref 136–145)
SP GR UR STRIP: >1.03 (ref 1–1.03)
UROBILINOGEN UR STRIP-ACNC: NORMAL
WBC # BLD AUTO: 9.6 X10(3) UL (ref 4–11)

## 2025-03-21 PROCEDURE — 80053 COMPREHEN METABOLIC PANEL: CPT

## 2025-03-21 PROCEDURE — 36415 COLL VENOUS BLD VENIPUNCTURE: CPT

## 2025-03-21 PROCEDURE — 81001 URINALYSIS AUTO W/SCOPE: CPT

## 2025-03-21 PROCEDURE — 85025 COMPLETE CBC W/AUTO DIFF WBC: CPT

## 2025-03-21 PROCEDURE — 74177 CT ABD & PELVIS W/CONTRAST: CPT

## 2025-03-21 PROCEDURE — 87086 URINE CULTURE/COLONY COUNT: CPT

## 2025-03-21 PROCEDURE — 99024 POSTOP FOLLOW-UP VISIT: CPT

## 2025-03-21 RX ORDER — METHOCARBAMOL 500 MG/1
500 TABLET, FILM COATED ORAL 3 TIMES DAILY
Qty: 21 TABLET | Refills: 0 | Status: SHIPPED | OUTPATIENT
Start: 2025-03-21 | End: 2025-03-28

## 2025-03-21 RX ORDER — NITROFURANTOIN 25; 75 MG/1; MG/1
100 CAPSULE ORAL 2 TIMES DAILY
Qty: 10 CAPSULE | Refills: 0 | Status: SHIPPED | OUTPATIENT
Start: 2025-03-21 | End: 2025-03-26

## 2025-03-21 NOTE — TELEPHONE ENCOUNTER
CT abdomen without acute abdomen. Labs without strong evidence of obstructing retained stone. sending to patient's pharmacy antibiotics for UTI and pain medication. tylenol 400 mg every 6 hours, ibuprofen 600 mg every 8 hours, robaxin 3 times daily every 8 hours. call with updates

## 2025-03-21 NOTE — PROGRESS NOTES
Follow Up Visit Note       Active Problems      1. Periumbilical abdominal pain    2. Shortness of breath    3. RUQ abdominal pain    4. Dysuria          Chief Complaint   Chief Complaint   Patient presents with    Post-Op     S/P lap morgan 3/5/25.  Patient states she has pain in the umbilical area, bowels are normal and regular, appetite is good.  Denies fevers.         History of Present Illness  Tammi is a pleasant 35 year old year old patient presenting for post op appointment. She is s/p lap morgan with Dr. Whittaker 3/5. She reports she felt well after surgery and was with improving abdominal pain. The last few days however, she started to experience increasing abdominal pain, chest pain, and trouble breathing.     The pain is at her umbilical site. She is unsure what caused sudden onset of pain. She stopped taking pain medication 3 days after surgery. Due to the pain, she has taken ibuprofen with minimal improvement. She denies nausea or vomiting. She denies fever or chills. The pain is constant and ranges from dull to sharp. The pain awakes her from sleep, feels similar to previous gallstone attacks. Her bowel movements have been without issue.     She also endorses feeling like the wind got knocked out of her sometimes. Her chest feels heavy. She denies dizziness or lightheadedness.     She has a sensation of pressure at her suprapubic area and dysuria, denies hematuria. She states it feels similar to how she felt when she had UTIs      Allergies  Tammi is allergic to penicillins.    Past Medical / Surgical / Social / Family History    The past medical and past surgical history have been reviewed by me today.    Past Medical History:    Anemia complicating pregnancy in second trimester ([facility])    Anxiety    Back problem    Blindness of left eye    Calculus of kidney    Chlamydia    Chlamydia infection    Colon polyp    repeat CLN in 7 years    Congenital birth defect    casued optic damage    Decorative tattoo     Depression    History of adverse reaction to anesthesia    becomes \"aggitated\" when sedation initiated    History of suicidal ideation    hospitalized [facility]    History of suicidal ideation    hospitalized Elijah Linn    Peptic ulcer    H. Pylori    Sexually transmitted disease     Past Surgical History:   Procedure Laterality Date    Adenoidectomy      Colonoscopy N/A 11/26/2024    Procedure: COLONOSCOPY;  Surgeon: ARMOND Khan MD;  Location: [facility] ENDOSCOPY    Create eardrum opening,gen anesth      D & c      Egd  11/26/2024    Procedure: EGD; Surgeon: ARMOND Khan MD; Location: [facility] ENDOSCOPY    Eye surgery Bilateral     Laparoscopic cholecystectomy  03/05/2025       The family history and social history have been reviewed by me today.    Family History   Problem Relation Age of Onset    Cancer Mother     Ovarian Cancer Mother      Social History     Socioeconomic History    Marital status: Single   Tobacco Use    Smoking status: Former    Smokeless tobacco: Never   Vaping Use    Vaping status: Never Used   Substance and Sexual Activity    Alcohol use: Not Currently    Drug use: Not Currently        Current Outpatient Medications:     polyethylene glycol, PEG 3350, 17 g Oral Powd Pack, Take 17 g by mouth daily as needed., Disp: 30 each, Rfl: 0    ondansetron (ZOFRAN) 4 mg tablet, Take 1 tablet (4 mg total) by mouth every 8 (eight) hours as needed for Nausea., Disp: 15 tablet, Rfl: 0    Phenyleph-Shark Fanny O-Glyc-Pet (PREPARATION H RE), Place rectally., Disp: , Rfl:     albuterol 108 (90 Base) MCG/ACT Inhalation Aero Soln, Inhale 2 puffs into the lungs every 4 (four) hours as needed., Disp: , Rfl:      Review of Systems  The Review of Systems has been reviewed by me during today.  Review of Systems   Constitutional:  Negative for chills, fatigue and fever.   Respiratory:  Positive for chest tightness and shortness of breath.    Cardiovascular:  Negative for chest pain and leg swelling.   Gastrointestinal:   Positive for abdominal pain. Negative for constipation, diarrhea, nausea and vomiting.   Genitourinary:  Positive for dysuria.        Physical Findings   /62 (BP Location: Left arm, Patient Position: Sitting, Cuff Size: large)   Pulse 99   Wt 218 lb (98.9 kg)   LMP 02/26/2025 (Exact Date)   BMI 35.19 kg/m²   Physical Exam  Constitutional:       General: She is not in acute distress.     Appearance: She is not ill-appearing.   HENT:      Head: Normocephalic and atraumatic.   Eyes:      Comments: Bilateral nystagmus    Abdominal:      Palpations: Abdomen is soft.      Tenderness: There is abdominal tenderness in the periumbilical area. There is no guarding or rebound.      Hernia: No hernia is present.   Skin:     General: Skin is warm.      Comments: incision C/D/I. Small opening at umbilical site, silver nitrate and bandaid applied.    Neurological:      Mental Status: She is alert.          Assessment   1. Periumbilical abdominal pain    2. Shortness of breath    3. RUQ abdominal pain    4. Dysuria      Pathology reviewed with the patient    Plan   STAT labs and imaging to rule out acute abdominal process, retained bile stone, bowel obstruction, UTI, etc  FU patient on labs and further clinical course  Patient educated to go to the ER if worsening abdominal pain, nausea, vomiting, fever, chills; she expresses understanding and agreement.       Orders Placed This Encounter   Procedures    Urinalysis with Culture Reflex    Comp Metabolic Panel (14)    CBC W Differential W Platelet       Imaging & Referrals   None    Follow Up  No follow-ups on file.    AJIT Alejandra

## 2025-04-02 ENCOUNTER — TELEPHONE (OUTPATIENT)
Facility: CLINIC | Age: 35
End: 2025-04-02

## 2025-04-14 ENCOUNTER — TELEPHONE (OUTPATIENT)
Facility: CLINIC | Age: 35
End: 2025-04-14

## 2025-04-14 DIAGNOSIS — M25.571 RIGHT ANKLE PAIN, UNSPECIFIED CHRONICITY: Primary | ICD-10-CM

## 2025-04-14 NOTE — TELEPHONE ENCOUNTER
New pt appt for sprained rt ankle, no imaging avail, pt will arrive early for imaging   Future Appointments   Date Time Provider Department Center   4/28/2025  2:00 PM Angella Rucker MD [facility]  [facility] [place]

## 2025-04-14 NOTE — TELEPHONE ENCOUNTER
Xray ordered, scheduled an patient is aware to come in early.    Future Appointments   Date Time Provider Department Center   4/28/2025  1:45 PM [facility] XR IC [facility]1 [facility] [place]   4/28/2025  2:00 PM Angella Rucker MD [facility] [place]

## 2025-04-15 ENCOUNTER — TELEPHONE (OUTPATIENT)
Dept: SURGERY | Facility: CLINIC | Age: 35
End: 2025-04-15

## 2025-04-15 NOTE — TELEPHONE ENCOUNTER
Per patient calling stating significant other Hudson Gutierrez had stayed with her through 3/5/2025 - 3/6/2025 and is requesting a note that excuses him from work those days. Please call back .

## 2025-04-15 NOTE — TELEPHONE ENCOUNTER
Informed patient that letter was sent to [facility].  Pt verbalized understand and has no additional questions.

## 2025-04-28 ENCOUNTER — OFFICE VISIT (OUTPATIENT)
Dept: ORTHOPEDICS CLINIC | Facility: CLINIC | Age: 35
End: 2025-04-28
Payer: MEDICAID

## 2025-04-28 ENCOUNTER — HOSPITAL ENCOUNTER (OUTPATIENT)
Dept: GENERAL RADIOLOGY | Age: 35
Discharge: HOME | End: 2025-04-28
Attending: ORTHOPAEDIC SURGERY
Payer: MEDICAID

## 2025-04-28 VITALS — BODY MASS INDEX: 35.03 KG/M2 | WEIGHT: 218 LBS | HEIGHT: 66 IN

## 2025-04-28 DIAGNOSIS — M25.571 RIGHT ANKLE PAIN, UNSPECIFIED CHRONICITY: ICD-10-CM

## 2025-04-28 DIAGNOSIS — S93.401A SPRAIN OF RIGHT ANKLE, UNSPECIFIED LIGAMENT, INITIAL ENCOUNTER: Primary | ICD-10-CM

## 2025-04-28 PROCEDURE — 99203 OFFICE O/P NEW LOW 30 MIN: CPT | Performed by: ORTHOPAEDIC SURGERY

## 2025-04-28 PROCEDURE — 73610 X-RAY EXAM OF ANKLE: CPT | Performed by: ORTHOPAEDIC SURGERY

## 2025-04-28 NOTE — PROGRESS NOTES
[facility] Orthopaedic New Patient Consult        Chief Complaint: Chronic right ankle pain      History: The patient is a 35 year old female presenting with friend for orthopaedic consultation due to chronic right ankle pain after sustaining an injury where she missed stepped and twisted her foot and ankle navigating the stairs in December.  After steady improvement, she describes a recurrent injury where she twisted it similarly.  All of her pain and swelling was localized anterior laterally.  Although she is not requiring the boot any longer, she still complains of intermittent pain with certain positions and activities localized anterior laterally.  X-rays from available to compare to her December plain films.  No formal treatment has been initiated to date.     Past Medical History[1]  Past Surgical History[2]  Current Medications[3]  Allergies[4]  Family History[5]  Social History     Occupational History    Not on file   Tobacco Use    Smoking status: Former    Smokeless tobacco: Never   Vaping Use    Vaping status: Never Used   Substance and Sexual Activity    Alcohol use: Not Currently    Drug use: Not Currently    Sexual activity: Not on file        ROS:  Complete ROS reviewed by me and non-contributory to the chief complaint except as mentioned above.    Physical Exam:    Ht 5' 6\" (1.676 m)   Wt 218 lb (98.9 kg)   LMP 02/26/2025 (Exact Date)   BMI 35.19 kg/m²   Constitutional: Well developed, well nourished 35 year old female  Psychological: NAD, alert and appropriate  Respiratory: Breathing comfortably on room air with RR of 10-14  Cardiac: Palpable distal pulses with pink warm extremities  Right lower extremity exam:  On examination there is significant discoloration or noticeable swelling about the right ankle.  She is sensitive to palpation over the ATF and CF ligaments with minimal pain over the lateral malleolus.  Medial side is benign.  She has symmetrical active dorsi and plantarflexion  with a negative anterior drawer.  Achilles is nontender.  Distal exam is intact.        Imaging Results:    Multiple x-ray views of the injured right ankle today pending radiology review reveals no fracture, dislocation or bony abnormality..     Assessment: Diagnoses and all orders for this visit:    Diagnoses and all orders for this visit:    Sprain of right ankle, unspecified ligament, initial encounter  -     Physical Therapy Referral - External        Plan:  I reviewed imaging and exam findings with the patient and her friend.  She likely sustained a lateral ankle sprain for which I recommend ongoing conservative care.  Her residual symptoms may be due to her recurrent injury and development of scar tissue which may be binding her terminal range of motion.  Although she has acceptable flexibility, she still has pain on end range.  I therefore recommend a course of physical therapy for joint mobilization, range of motion and light close kinetic chain strengthening.  This can be transition to a self-directed home exercise program.  All questions were answered and she verbalized understanding and appreciation.  Follow-up with me as needed.    Angella Rucker MD, [facility]  Orthopaedic Surgery   Sports Medicine/Knee and Shoulder  [facility]  t: [phone]  f: [phone]               This document was partially prepared using Dragon Medical voice recognition software.  Although every attempt is made to correct errors during dictation, discrepancies may still exist.           [1]   Past Medical History:   Anemia complicating pregnancy in second trimester (HCC)    Anxiety    Back problem    Blindness of left eye    Calculus of kidney    Chlamydia    Chlamydia infection    Colon polyp    repeat CLN in 7 years    Congenital birth defect    casued optic damage    Decorative tattoo    Depression    History of adverse reaction to anesthesia    becomes \"aggitated\" when sedation  initiated    History of suicidal ideation    hospitalized [facility]    History of suicidal ideation    hospitalized [facility]    Peptic ulcer    H. Pylori    Sexually transmitted disease   [2]   Past Surgical History:  Procedure Laterality Date    Adenoidectomy      Colonoscopy N/A 11/26/2024    Procedure: COLONOSCOPY;  Surgeon: ARMOND Khan MD;  Location: [facility] ENDOSCOPY    Create eardrum opening,gen anesth      D & c      Egd  11/26/2024    Procedure: EGD; Surgeon: ARMOND Khan MD; Location: [facility] ENDOSCOPY    Eye surgery Bilateral     Laparoscopic cholecystectomy  03/05/2025   [3]   Current Outpatient Medications   Medication Sig Dispense Refill    polyethylene glycol, PEG 3350, 17 g Oral Powd Pack Take 17 g by mouth daily as needed. 30 each 0    ondansetron (ZOFRAN) 4 mg tablet Take 1 tablet (4 mg total) by mouth every 8 (eight) hours as needed for Nausea. 15 tablet 0    albuterol 108 (90 Base) MCG/ACT Inhalation Aero Soln Inhale 2 puffs into the lungs every 4 (four) hours as needed.      Phenyleph-Shark Fanny O-Glyc-Pet (PREPARATION H RE) Place rectally.     [4]   Allergies  Allergen Reactions    Penicillins HIVES     No organ damage  No skin blistering or sloughing   [5]   Family History  Problem Relation Age of Onset    Cancer Mother     Ovarian Cancer Mother

## 2025-05-30 ENCOUNTER — TELEPHONE (OUTPATIENT)
Facility: CLINIC | Age: 35
End: 2025-05-30

## 2025-05-30 NOTE — TELEPHONE ENCOUNTER
Patient is requesting to speak directly to Cornelia Luque about current health condition please call

## 2025-06-10 NOTE — TELEPHONE ENCOUNTER
I returned the pts call.   She says cell phone about to die  Was at [facility]  Did repeat egd and found ulcer  She was started on sucralfate along with ppi  She says has not been told path findings    Repeat egd advised, but she moved and has had difficulty finding md for f/U.    Recommend:  She [facility] (prescribing facility) for refills and to f/U on path findings    She verbalizes understanding and is in agreement with the plan  She will call me if additional assistance is needed

## (undated) DEVICE — PLUMEPORT ACTIV LAPAROSCOPIC SMOKE FILTRATION DEVICE: Brand: PLUMEPORT ACTIVE

## (undated) DEVICE — CLIP APPLIER WITH CLIP LOGIC TECHNOLOGY: Brand: ENDO CLIP III

## (undated) DEVICE — SOLUTION IV 1000ML 0.9% NACL PRESERVATIVE

## (undated) DEVICE — [HIGH FLOW INSUFFLATOR,  DO NOT USE IF PACKAGE IS DAMAGED,  KEEP DRY,  KEEP AWAY FROM SUNLIGHT,  PROTECT FROM HEAT AND RADIOACTIVE SOURCES.]: Brand: PNEUMOSURE

## (undated) DEVICE — V2 SPECIMEN COLLECTION MANIFOLD KIT: Brand: NEPTUNE

## (undated) DEVICE — SUT COAT VCRL+ 0 27IN UR-6 ABSRB VLT ANTIBACT

## (undated) DEVICE — CONMED SCOPE SAVER BITE BLOCK, 20X27 MM: Brand: SCOPE SAVER

## (undated) DEVICE — SYRINGE, LUER SLIP, STERILE, 60ML: Brand: MEDLINE

## (undated) DEVICE — CO2 CANNULA,SSOFT,ADLT,7O2,4CO2,FEMALE: Brand: MEDLINE

## (undated) DEVICE — TROCAR: Brand: KII® SLEEVE

## (undated) DEVICE — LASSO POLYPECTOMY SNARE: Brand: LASSO

## (undated) DEVICE — KIT CLEAN ENDOKIT 1.1OZ GOWNX2

## (undated) DEVICE — LAP CHOLE: Brand: MEDLINE INDUSTRIES, INC.

## (undated) DEVICE — TROCAR 12MM L100 HASSAN NON BLADED W/BALLOON

## (undated) DEVICE — MEDI-VAC NON-CONDUCTIVE SUCTION TUBING: Brand: CARDINAL HEALTH

## (undated) DEVICE — BINDER ABD L/XL H12XL62IN 4 PNL UNIV PREM

## (undated) DEVICE — SOLUTION IRRIG 1000ML 0.9% NACL USP BTL

## (undated) DEVICE — KIT ENDO ORCAPOD 160/180/190

## (undated) DEVICE — GAMMEX® PI HYBRID SIZE 7.5, STERILE POWDER-FREE SURGICAL GLOVE, POLYISOPRENE AND NEOPRENE BLEND: Brand: GAMMEX

## (undated) DEVICE — SOLUTION IRRIG 1000ML ST H2O AQUALITE PLAS

## (undated) DEVICE — SUT MCRYL 4-0 18IN PS-2 ABSRB UD 19MM 3/8 CIR

## (undated) DEVICE — ADHESIVE SKIN TOP FOR WND CLSR DERMBND ADV

## (undated) DEVICE — YANKAUER,BULB TIP,W/O VENT,RIGID,STERILE: Brand: MEDLINE

## (undated) DEVICE — GIJAW SINGLE-USE BIOPSY FORCEPS WITH NEEDLE: Brand: GIJAW

## (undated) DEVICE — INSULATED BLADE ELECTRODE: Brand: EDGE

## (undated) DEVICE — MEDI-VAC NON-CONDUCTIVE SUCTION TUBING 6MM X 1.8M (6FT.) L: Brand: CARDINAL HEALTH

## (undated) DEVICE — TISSUE RETRIEVAL SYSTEM: Brand: INZII RETRIEVAL SYSTEM

## (undated) DEVICE — TROCAR: Brand: KII FIOS FIRST ENTRY

## (undated) NOTE — LETTER
Date & Time: 11/8/2022, 8:08 PM  Patient: Cody Naylor  Encounter Provider(s):    Yoni Gonzalez PA-C       To Whom It May Concern:    Chester Sin was seen and treated in our department on 11/8/2022. She can return to work.     If you have any questions or concerns, please do not hesitate to call.        _____________________________  Physician/APC Signature

## (undated) NOTE — LETTER
[facility]  Administration of Anesthesia  I, Tammi De La Garza agree to be cared for by a physician anesthesiologist alone and/or with a nurse anesthetist, who is specially trained to monitor me and give me medicine to put me to sleep or keep me comfortable during my procedure    I understand that my anesthesiologist and/or anesthetist is not an employee or agent of [facility] or [employer]. He or she works for [facility]    As the patient asking for anesthesia services, I agree to:  Allow the anesthesiologist (anesthesia doctor) to give me medicine and do additional procedures as necessary. Some examples are: Starting or using an “IV” to give me medicine, fluids or blood during my procedure, and having a breathing tube placed to help me breathe when I’m asleep (intubation). In the event that my heart stops working properly, I understand that my anesthesiologist will make every effort to sustain my life, unless otherwise directed by [facility] Do Not Resuscitate documents.  Tell my anesthesia doctor before my procedure:  If I am pregnant.  The last time that I ate or drank.  iii. All of the medicines I take (including prescriptions, herbal supplements, and pills I can buy without a prescription (including street drugs/illegal medications). Failure to inform my anesthesiologist about these medicines may increase my risk of anesthetic complications.  iv.If I am allergic to anything or have had a reaction to anesthesia before.  I understand how the anesthesia medicine will help me (benefits).  I understand that with any type of anesthesia medicine there are risks:  The most common risks are: nausea, vomiting, sore throat, muscle soreness, damage to my eyes, mouth, or teeth (from breathing tube placement).  Rare risks include: remembering what happened during my procedure, allergic reactions to medications, injury to my airway, heart, lungs, vision, nerves, or  muscles and in extremely rare instances death.  My doctor has explained to me other choices available to me for my care (alternatives).  Pregnant Patients (“epidural”):  I understand that the risks of having an epidural (medicine given into my back to help control pain during labor), include itching, low blood pressure, difficulty urinating, headache or slowing of the baby’s heart. Very rare risks include infection, bleeding, seizure, irregular heart rhythms and nerve injury.  Regional Anesthesia (“spinal”, “epidural”, & “nerve blocks”):  I understand that rare but potential complications include headache, bleeding, infection, seizure, irregular heart rhythms, and nerve injury.    _____________________________________________________________________________  Patient (or Representative) Signature/Relationship to Patient  Date   Time    _____________________________________________________________________________   Name (if used)    Language/Organization   Time    _____________________________________________________________________________  Nurse Anesthetist Signature     Date   Time  _____________________________________________________________________________  Anesthesiologist Signature     Date   Time  I have discussed the procedure and information above with the patient (or patient’s representative) and answered their questions. The patient or their representative has agreed to have anesthesia services.    _____________________________________________________________________________  Witness        Date   Time  I have verified that the signature is that of the patient or patient’s representative, and that it was signed before the procedure  Patient Name: Tammi De La Garza     : 3/11/1990                 Printed: 2024 at 7:49 AM    Medical Record #: [id number]                                            Page 1 of 1  ----------ANESTHESIA CONSENT----------

## (undated) NOTE — LETTER
[facility]  [address], [place], [place]    Authorization for Surgical Operation and Procedure                               I hereby authorize ARMOND Khan MD, my physician and his/her assistants (if applicable), which may include medical students, residents, and/or fellows, to perform the following surgical operation/ procedure and administer such anesthesia as may be determined necessary by my physician: Operation/Procedure name (s) COLONOSCOPY / ESOPHAGOGASTRODUODENOSCOPY on Tammi De La Garza   2.   I recognize that during the surgical operation/procedure, unforeseen conditions may necessitate additional or different procedures than those listed above.  I, therefore, further authorize and request that the above-named surgeon, assistants, or designees perform such procedures as are, in their judgment, necessary and desirable.    3.   My surgeon/physician has discussed prior to my surgery the potential benefits, risks and side effects of this procedure; the likelihood of achieving goals; and potential problems that might occur during recuperation.  They also discussed reasonable alternatives to the procedure, including risks, benefits, and side effects related to the alternatives and risks related to not receiving this procedure.  I have had all my questions answered and I acknowledge that no guarantee has been made as to the result that may be obtained.    4.   Should the need arise during my operation/procedure, which includes change of level of care prior to discharge, I also consent to the administration of blood and/or blood products.  Further, I understand that despite careful testing and screening of blood or blood products by collecting agencies, I may still be subject to ill effects as a result of receiving a blood transfusion and/or blood products.  The following are some, but not all, of the potential risks that can occur: fever and allergic reactions, hemolytic reactions,  transmission of diseases such as Hepatitis, AIDS and Cytomegalovirus (CMV) and fluid overload.  In the event that I wish to have an autologous transfusion of my own blood, or a directed donor transfusion, I will discuss this with my physician.  Check only if Refusing Blood or Blood Products  I understand refusal of blood or blood products as deemed necessary by my physician may have serious consequences to my condition to include possible death. I hereby assume responsibility for my refusal and release the hospital, its personnel, and my physicians from any responsibility for the consequences of my refusal.    o  Refuse   5.   I authorize the use of any specimen, organs, tissues, body parts or foreign objects that may be removed from my body during the operation/procedure for diagnosis, research or teaching purposes and their subsequent disposal by hospital authorities.  I also authorize the release of specimen test results and/or written reports to my treating physician on the hospital medical staff or other referring or consulting physicians involved in my care, at the discretion of the Pathologist or my treating physician.    6.   I consent to the photographing or videotaping of the operations or procedures to be performed, including appropriate portions of my body for medical, scientific, or educational purposes, provided my identity is not revealed by the pictures or by descriptive texts accompanying them.  If the procedure has been photographed/videotaped, the surgeon will obtain the original picture, image, videotape or CD.  The hospital will not be responsible for storage, release or maintenance of the picture, image, tape or CD.    7.   I consent to the presence of a  or observers in the operating room as deemed necessary by my physician or their designees.    8.   I recognize that in the event my procedure results in extended X-Ray/fluoroscopy time, I may develop a skin reaction.    9. If  I have a Do Not Attempt Resuscitation (DNAR) order in place, that status will be suspended while in the operating room, procedural suite, and during the recovery period unless otherwise explicitly stated by me (or a person authorized to consent on my behalf). The surgeon or my attending physician will determine when the applicable recovery period ends for purposes of reinstating the DNAR order.  10. Patients having a sterilization procedure: I understand that if the procedure is successful the results will be permanent and it will therefore be impossible for me to inseminate, conceive, or bear children.  I also understand that the procedure is intended to result in sterility, although the result has not been guaranteed.   11. I acknowledge that my physician has explained sedation/analgesia administration to me including the risk and benefits I consent to the administration of sedation/analgesia as may be necessary or desirable in the judgment of my physician.    I CERTIFY THAT I HAVE READ AND FULLY UNDERSTAND THE ABOVE CONSENT TO OPERATION and/or OTHER PROCEDURE.     ____________________________________  _________________________________        ______________________________  Signature of Patient    Signature of Responsible Person                Printed Name of Responsible Person                                      ____________________________________  _____________________________                ________________________________  Signature of Witness        Date  Time         Relationship to Patient    STATEMENT OF PHYSICIAN My signature below affirms that prior to the time of the procedure; I have explained to the patient and/or his/her legal representative, the risks and benefits involved in the proposed treatment and any reasonable alternative to the proposed treatment. I have also explained the risks and benefits involved in refusal of the proposed treatment and alternatives to the proposed treatment and have  answered the patient's questions. If I have a significant financial interest in a co-management agreement or a significant financial interest in any product or implant, or other significant relationship used in this procedure/surgery, I have disclosed this and had a discussion with my patient.     _____________________________________________________              _____________________________  (Signature of Physician)                                                                                         (Date)                                   (Time)  Patient Name: Tammi GARVEY Holli      : 3/11/1990      Printed: 2024     Medical Record #: [id number]                                      Page 1 of 1

## (undated) NOTE — LETTER
Date & Time: 12/11/2024, 1:42 PM  Patient: Tammi De La Garza  Encounter Provider(s):    Pablo Ames APRN       To Whom It May Concern:    Tammi De La Garza was seen and treated in our department on 12/11/2024. Hudson Gutierrez drove her for this visit, please excuse from work during this time.  If you have any questions or concerns, please do not hesitate to call.        _____________________________  Physician/APC Signature

## (undated) NOTE — LETTER
12/24/2024          Tammi De La Garza    [address]    [facility] [address]         Dear Tammi,    Our records indicate that the tests ordered for you by NELY Dumont  have not been done.                                           Scheduled appointment 12/27/2024  US ABDOMEN COMPLETE (CPT=76700) (Order #[id number]) on 11/20/24                Pending labs:  C. diff toxigenic PCR (OPT)  Giardia + Crypto Antigen, Stool  Ova and Parasites with Giardia Antigen [Quest [id number]]  Stool Culture W/ Shigatoxin    If you have, in fact, already completed the tests or you do not wish to have the tests done, please contact our office at THE NUMBER LISTED BELOW.  Otherwise, please proceed with the testing.  Enclosed is a duplicate order for your convenience.        Sincerely,    NELY Dumont  [address] [id number] [phone]

## (undated) NOTE — LETTER
Date & Time: 5/9/2023, 2:52 PM  Patient: Vijay Adorno  Encounter Provider(s):    NELY Feliz       To Whom It May Concern:    Please excuse Jhony Skinner from work today 05/09/2023. He has had to care for his ill family member. If you have any questions or concerns, please do not hesitate to call.       Joellyn Fleischer, FNP-BC  _____________________________  [id number] Signature

## (undated) NOTE — LETTER
4/15/2025          To Whom It May Concern:    Hudson Matt was caring for Tammi De La Garza after surgery for  the following dates:  March 5th and 6th and will need to be excused.      If you require additional information please contact our office.        Sincerely,          Wily Whittaker MD          Document generated by:  NW

## (undated) NOTE — LETTER
Date & Time: 10/7/2024, 2:26 PM  Patient: Tammi De La Garza  Encounter Provider(s):    Alecia Gold PA-C       To Whom It May Concern:    Hudson Gutierrez accompanied Tammi De La Garza who was seen and treated in our department on 10/7/2024.     If you have any questions or concerns, please do not hesitate to call.        _____________________________  Physician/APC Signature

## (undated) NOTE — LETTER
2/10/2025          Tammi De La Garza    [address]    [facility] [address]         Dear Tammi,    Our records indicate that the tests ordered for you by NELY Dumont  have not been done.  If you have, in fact, already completed the tests or you do not wish to have the tests done, please contact our office at THE NUMBER LISTED BELOW.  Otherwise, please proceed with the testing.  Enclosed is a duplicate order for your convenience.      C. diff toxigenic PCR (OPT)    Giardia + Crypto Antigen, Stool    Stool Culture W/ Shigatoxin    Ova and Parasites with Giardia Antigen [[facility] [id number]]        Sincerely,    NELY Dumont  [facility], [facility]  [address]  [place] [address] [phone]